# Patient Record
Sex: MALE | Race: WHITE
[De-identification: names, ages, dates, MRNs, and addresses within clinical notes are randomized per-mention and may not be internally consistent; named-entity substitution may affect disease eponyms.]

---

## 2020-02-27 ENCOUNTER — HOSPITAL ENCOUNTER (EMERGENCY)
Dept: HOSPITAL 97 - ER | Age: 40
Discharge: HOME | End: 2020-02-27
Payer: SELF-PAY

## 2020-02-27 DIAGNOSIS — J01.90: Primary | ICD-10-CM

## 2020-02-27 DIAGNOSIS — F17.210: ICD-10-CM

## 2020-02-27 PROCEDURE — 99283 EMERGENCY DEPT VISIT LOW MDM: CPT

## 2020-02-27 PROCEDURE — 87070 CULTURE OTHR SPECIMN AEROBIC: CPT

## 2020-02-27 PROCEDURE — 71046 X-RAY EXAM CHEST 2 VIEWS: CPT

## 2020-02-27 PROCEDURE — 87081 CULTURE SCREEN ONLY: CPT

## 2020-02-27 NOTE — ER
Nurse's Notes                                                                                     

 [facility]                                                                 

Name: Gil Carrasco                                                                                  

Age: 39 yrs                                                                                       

Sex: Male                                                                                         

: 1980                                                                                   

MRN: [id number]                                                                                   

Arrival Date: 2020                                                                          

Time: 21:30                                                                                       

Account#: [id number]                                                                            

Bed 27                                                                                            

Private MD:                                                                                       

Diagnosis: Acute sinusitis                                                                        

                                                                                                  

Presentation:                                                                                     

                                                                                             

22:36 Chief complaint: Patient states: BEEN SICK FOR FOUR WEEKS AND JUST CANT KICK IT.        ls4 

      STARTED WITH FLU, THEN COUGH AND CONGESTION. STILL COUGHING, RUNNY NOSE. Coronavirus        

      screen: The patient has NOT traveled to [place] in the past 14 days. Proceed with normal      

      triage procedures. The patient has NOT had contact with known and/or suspected case of      

      Coronavirus. Proceed with normal triage procedures. Ebola Screen: No symptoms or risks      

      identified at this time. Initial Sepsis Screen: Does the patient meet any 2 criteria?       

      No. Patient's initial sepsis screen is negative. Does the patient have a suspected          

      source of infection? No. Patient's initial sepsis screen is negative. Risk Assessment:      

      Do you want to hurt yourself or someone else? Patient reports no desire to harm self or     

      others. Onset of symptoms is unknown. Care prior to arrival: BEEN TAKING OTC MEDS.          

      Activity prior to arrival: None. Transition of care: patient was not received from          

      another setting of care.                                                                    

22:36 Method Of Arrival: Ambulatory                                                           ls4 

22:36 Acuity: ARTUR 3                                                                           ls4 

                                                                                                  

Triage Assessment:                                                                                

22:41 General: Appears uncomfortable, Behavior is calm, cooperative. Pain: Denies pain.       ls4 

      Neuro: No deficits noted. Cardiovascular: No deficits noted. Respiratory: Reports cough     

      that is productive, persistent Airway is patent Respiratory effort is even, unlabored,      

      Respiratory pattern is regular, Breath sounds are clear bilaterally. the patient has        

      moderate shortness of breath Denies shortness of breath at rest, on exertion, labored       

      breathing, pain with respiration, pain with cough, pain with movement. GI: No deficits      

      noted. : No deficits noted. Derm: No deficits noted. Musculoskeletal: No deficits         

      noted.                                                                                      

                                                                                                  

Historical:                                                                                       

- Allergies:                                                                                      

22:41 No Known Allergies;                                                                     ls4 

- Home Meds:                                                                                      

22:41 None [Active];                                                                          ls4 

- PMHx:                                                                                           

22:41 None;                                                                                   ls4 

- PSHx:                                                                                           

22:41 None;                                                                                   ls4 

                                                                                                  

- Immunization history:: Adult Immunizations up to date, Last tetanus immunization:               

  unknown, Flu vaccine is not up to date. It has been more than one year since last               

  vaccine.                                                                                        

- Social history:: Smoking status: Patient reports the use of cigarette tobacco                   

  products, smokes one-half pack cigarettes per day, Patient/guardian denies using                

  alcohol, street drugs.                                                                          

                                                                                                  

                                                                                                  

Screenin:45 Abuse screen: Denies threats or abuse. Denies injuries from another. Nutritional        ls4 

      screening: No deficits noted. Tuberculosis screening: No symptoms or risk factors           

      identified. Fall Risk None identified.                                                      

                                                                                                  

Assessment:                                                                                       

22:44 General: SEE TRIAGE .                                                                   ls4 

23:45 Reassessment: Patient appears in no apparent distress at this time. No changes from     ls4 

      previously documented assessment. Patient and/or family updated on plan of care and         

      expected duration. Pain level reassessed. Patient is alert, oriented x 3, equal             

      unlabored respirations, skin warm/dry/pink.                                                 

                                                                                                  

Vital Signs:                                                                                      

22:13  / 93; Pulse 97; Resp 17; Temp 97.9; Pulse Ox 98% ;                               lt1 

23:45  / 84; Pulse 78; Resp 14; Temp 98.0(O); Pulse Ox 98% on R/A; Pain 0/10;           ls4 

                                                                                                  

ED Course:                                                                                        

21:30 Patient arrived in ED.                                                                  ag3 

22:12 Mayte Hull, RN is Primary Nurse.                                                     ls4 

22:34 [facility]                                                        kb  

22:34 Charles Priteo MD is Attending Physician.                                                    kb  

22:41 Triage completed.                                                                       ls4 

22:41 Arm band placed on right wrist.                                                         ls4 

22:45 Patient has correct armband on for positive identification. Bed in low position. Call   ls4 

      light in reach. Side rails up X 1. Pulse ox on. NIBP on.                                    

22:45 No provider procedures requiring assistance completed.                                  ls4 

22:59 Chest Pa And Lat (2 Views) XRAY In Process Unspecified.                                 EDMS

23:46 Patient did not have IV access during this emergency room visit.                        ls4 

                                                                                                  

Administered Medications:                                                                         

No medications were administered                                                                  

                                                                                                  

                                                                                                  

Outcome:                                                                                          

23:26 Discharge ordered by MD.                                                                kb  

23:45 Discharged to home ambulatory.                                                          ls4 

23:45 Condition: good                                                                             

23:45 Discharge instructions given to patient, family, Instructed on discharge instructions,      

      follow up and referral plans. Demonstrated understanding of instructions, follow-up         

      care, medications, Prescriptions given X 1.                                                 

23:46 Patient left the ED.                                                                    ls4 

                                                                                                  

Signatures:                                                                                       

Dispatcher [employer]                           EDAbby Guido, FNP-C                 FNP-Ckb                                                   

Karolina Melendrez                                 ag3                                                  

Mayte Hull, RN                       RN   ls4                                                  

Francisca Echevarria                                   lt1                                                  

                                                                                                  

Corrections: (The following items were deleted from the chart)                                    

23:46 23:42 Discharged to nursing home. Report called to DEN CALABRESE ls4                     ls4 

                                                                                                  

**************************************************************************************************

## 2020-02-27 NOTE — EDPHYS
Physician Documentation                                                                           

 [facility]                                                                 

Name: Gil Carrasco                                                                                  

Age: 39 yrs                                                                                       

Sex: Male                                                                                         

: 1980                                                                                   

MRN: [id number]                                                                                   

Arrival Date: 2020                                                                          

Time: 21:30                                                                                       

Account#: [id number]                                                                            

Bed 27                                                                                            

Private MD:                                                                                       

ED Physician Charles Prieto                                                                             

HPI:                                                                                              

                                                                                             

23:33 This 39 yrs old  Male presents to ER via Ambulatory with complaints of Cough.  kb  

23:33 The patient or guardian reports cough, that is intermittent, described as moderate,     kb  

      with no sputum, flu symptoms, low-grade fever. Onset: The symptoms/episode                  

      began/occurred 4 week(s) ago. Severity of symptoms: At their worst the symptoms were        

      moderate, in the emergency department the symptoms are unchanged. Modifying factors:        

      The symptoms are alleviated by nothing, the symptoms are aggravated by nothing.             

      Associated signs and symptoms: Pertinent positives: fever, rhinorrhea, sore throat. The     

      patient has not experienced similar symptoms in the past. The patient has not recently      

      seen a physician. Pt reports he has been sick for 4 weeks. Reports it started out as        

      the flu and then progressed. Reports fever subsided last week, but continues to have        

      cough and congestion.                                                                       

                                                                                                  

Historical:                                                                                       

- Allergies:                                                                                      

22:41 No Known Allergies;                                                                     ls4 

- Home Meds:                                                                                      

22:41 None [Active];                                                                          ls4 

- PMHx:                                                                                           

22:41 None;                                                                                   ls4 

- PSHx:                                                                                           

22:41 None;                                                                                   ls4 

                                                                                                  

- Immunization history:: Adult Immunizations up to date, Last tetanus immunization:               

  unknown, Flu vaccine is not up to date. It has been more than one year since last               

  vaccine.                                                                                        

- Social history:: Smoking status: Patient reports the use of cigarette tobacco                   

  products, smokes one-half pack cigarettes per day, Patient/guardian denies using                

  alcohol, street drugs.                                                                          

                                                                                                  

                                                                                                  

ROS:                                                                                              

23:30 Neck: Negative for injury, pain, and swelling, Cardiovascular: Negative for chest pain, kb  

      palpitations, and edema, Abdomen/GI: Negative for abdominal pain, nausea, vomiting,         

      diarrhea, and constipation, Back: Negative for injury and pain, MS/Extremity: Negative      

      for injury and deformity, Skin: Negative for injury, rash, and discoloration, Neuro:        

      Negative for headache, weakness, numbness, tingling, and seizure.                           

23:30 Constitutional: Positive for fever.                                                         

23:30 ENT: Positive for rhinorrhea, sinus congestion.                                             

23:30 Respiratory: Positive for cough, Negative for dyspnea on exertion, hemoptysis,              

      orthopnea, pleurisy, shortness of breath, sputum production, wheezing.                      

                                                                                                  

Exam:                                                                                             

23:32 Constitutional:  This is a well developed, well nourished patient who is awake, alert,  kb  

      and in no acute distress. Head/Face:  Normocephalic, atraumatic. ENT:  Nares patent. No     

      nasal discharge, no septal abnormalities noted.  Tympanic membranes are normal and          

      external auditory canals are clear.  Oropharynx with no redness, swelling, or masses,       

      exudates, or evidence of obstruction, uvula midline.  Mucous membranes moist. Neck:         

      Trachea midline, no thyromegaly or masses palpated, and no cervical lymphadenopathy.        

      Supple, full range of motion without nuchal rigidity, or vertebral point tenderness.        

      No Meningismus. Chest/axilla:  Normal chest wall appearance and motion.  Nontender with     

      no deformity.  No lesions are appreciated. Cardiovascular:  Regular rate and rhythm         

      with a normal S1 and S2.  No gallops, murmurs, or rubs.  Normal PMI, no JVD.  No pulse      

      deficits. Respiratory:  Lungs have equal breath sounds bilaterally, clear to                

      auscultation and percussion.  No rales, rhonchi or wheezes noted.  No increased work of     

      breathing, no retractions or nasal flaring. Abdomen/GI:  Soft, non-tender, with normal      

      bowel sounds.  No distension or tympany.  No guarding or rebound.  No evidence of           

      tenderness throughout. Skin:  Warm, dry with normal turgor.  Normal color with no           

      rashes, no lesions, and no evidence of cellulitis. MS/ Extremity:  Pulses equal, no         

      cyanosis.  Neurovascular intact.  Full, normal range of motion. Neuro:  Awake and           

      alert, GCS 15, oriented to person, place, time, and situation.  Cranial nerves II-XII       

      grossly intact.  Motor strength 5/5 in all extremities.  Sensory grossly intact.            

      Cerebellar exam normal.  Normal gait.                                                       

                                                                                                  

Vital Signs:                                                                                      

22:13  / 93; Pulse 97; Resp 17; Temp 97.9; Pulse Ox 98% ;                               lt1 

23:45  / 84; Pulse 78; Resp 14; Temp 98.0(O); Pulse Ox 98% on R/A; Pain 0/10;           ls4 

                                                                                                  

MDM:                                                                                              

22:34 Patient medically screened.                                                             kb  

23:30 Data reviewed: vital signs, nurses notes. Data interpreted: Pulse oximetry: on room air kb  

      is 98 %. Interpretation: normal. Counseling: I had a detailed discussion with the           

      patient and/or guardian regarding: the historical points, exam findings, and any            

      diagnostic results supporting the discharge/admit diagnosis, lab results, radiology         

      results, the need for outpatient follow up, a family practitioner, to return to the         

      emergency department if symptoms worsen or persist or if there are any questions or         

      concerns that arise at home.                                                                

                                                                                                  

                                                                                             

22:48 Order name: Strep; Complete Time: 23:28                                                 kb  

                                                                                             

23:26 Order name: Throat Culture                                                              EDMS

                                                                                             

22:43 Order name: Chest Pa And Lat (2 Views) XRAY                                             kb  

                                                                                                  

Administered Medications:                                                                         

No medications were administered                                                                  

                                                                                                  

                                                                                                  

Disposition:                                                                                      

                                                                                             

01:53 Co-signature as Attending Physician, Charlse Prieto MD.                                        alona 

                                                                                                  

Disposition:                                                                                      

20 23:26 Discharged to Home. Impression: Acute sinusitis.                                   

- Condition is Stable.                                                                            

- Discharge Instructions: Sinusitis, Adult, Easy-to-Read.                                         

- Prescriptions for Augmentin 875- 125 mg Oral Tablet - take 1 tablet by ORAL route               

  every 12 hours for 10 days; 20 tablet.                                                          

- Medication Reconciliation Form, Thank You Letter, Antibiotic Education, Prescription            

  Opioid Use form.                                                                                

- Follow up: Private Physician; When: 2 - 3 days; Reason: Recheck today's complaints,             

  Continuance of care, Re-evaluation by your physician. Follow up: Emergency                      

  Department; When: As needed; Reason: Worsening of condition.                                    

                                                                                                  

                                                                                                  

                                                                                                  

Signatures:                                                                                       

Dispatcher [employer]                           [facility]                                                 

Abby Veliz, FNP-C                 FNP-Ckb                                                   

Charles Prieto MD MD   pkl                                                  

Mayte Hull, RN                       RN   ls4                                                  

                                                                                                  

Corrections: (The following items were deleted from the chart)                                    

                                                                                             

23:46 23:26 2020 23:26 Discharged to Home. Impression: Acute sinusitis. Condition is    ls4 

      Stable. Forms are Medication Reconciliation Form, Thank You Letter, Antibiotic              

      Education, Prescription Opioid Use. Follow up: Private Physician; When: 2 - 3 days;         

      Reason: Recheck today's complaints, Continuance of care, Re-evaluation by your              

      physician. Follow up: Emergency Department; When: As needed; Reason: Worsening of           

      condition. kb                                                                               

                                                                                                  

**************************************************************************************************

## 2020-02-28 VITALS — OXYGEN SATURATION: 98 %

## 2020-02-28 VITALS — TEMPERATURE: 98 F | DIASTOLIC BLOOD PRESSURE: 84 MMHG | SYSTOLIC BLOOD PRESSURE: 112 MMHG

## 2020-02-28 NOTE — RAD REPORT
EXAM DESCRIPTION:  RAD - Chest Pa And Lat (2 Views) - 2/27/2020 11:00 pm

 

CLINICAL HISTORY:  Cough;Congestion

 

COMPARISON:  CHEST PA AND LAT 2 VIEW dated 6/18/2009

 

TECHNIQUE:  Frontal and lateral views of the chest were obtained.

 

FINDINGS:  The lungs are clear of a focal mass or consolidation. Interstitial pattern is similar or s
lightly increased from 2009 remote imaging. No peribronchial thickening seen. Hilar regions are simil
ar to comparison.   Heart size is normal and central vasculature is within normal limits.  No pleural
 effusion or pneumothorax seen.  No acute bony finding noted.  No aortic abnormality.

 

IMPRESSION:  No acute cardiopulmonary process.

 

Lung parenchymal pattern is not substantially different from comparison. Very minimal component of in
terstitial edema or infiltrate cannot be excluded.

## 2020-03-19 ENCOUNTER — HOSPITAL ENCOUNTER (EMERGENCY)
Dept: HOSPITAL 97 - ER | Age: 40
Discharge: HOME | End: 2020-03-19
Payer: SELF-PAY

## 2020-03-19 DIAGNOSIS — F17.210: ICD-10-CM

## 2020-03-19 DIAGNOSIS — J02.9: ICD-10-CM

## 2020-03-19 DIAGNOSIS — J20.9: Primary | ICD-10-CM

## 2020-03-19 PROCEDURE — 87081 CULTURE SCREEN ONLY: CPT

## 2020-03-19 PROCEDURE — 87804 INFLUENZA ASSAY W/OPTIC: CPT

## 2020-03-19 PROCEDURE — 87070 CULTURE OTHR SPECIMN AEROBIC: CPT

## 2020-03-19 PROCEDURE — 96372 THER/PROPH/DIAG INJ SC/IM: CPT

## 2020-03-19 PROCEDURE — 71045 X-RAY EXAM CHEST 1 VIEW: CPT

## 2020-03-19 PROCEDURE — 99284 EMERGENCY DEPT VISIT MOD MDM: CPT

## 2020-03-19 NOTE — ER
Nurse's Notes                                                                                     

 [facility]                                                                 

Name: Gil Carrasco                                                                                  

Age: 39 yrs                                                                                       

Sex: Male                                                                                         

: 1980                                                                                   

MRN: [id number]                                                                                   

Arrival Date: 2020                                                                          

Time: 19:30                                                                                       

Account#: [id number]                                                                            

Bed 15                                                                                            

Private MD:                                                                                       

Diagnosis: Acute bronchitis;Acute pharyngitis                                                     

                                                                                                  

Presentation:                                                                                     

                                                                                             

19:48 Chief complaint: Patient states: raz have cough and fever for almost 2 months now, i    mg2 

      was here 1 month ago and did the cxray and sent home with abx, got better for a week        

      and i am sick again. i have productive cough and my chest feels tight everytime i           

      cough. Coronavirus screen: The patient has NOT traveled to a country currently being        

      monitored by the [facility] within the last 14 days. Proceed with normal triage procedures.        

      The patient has NOT had contact with any known and/or suspected case of coronavirus.        

      Proceed with normal triage procedures. Ebola Screen: No symptoms or risks identified at     

      this time. Initial Sepsis Screen: Does the patient meet any 2 criteria? No. Patient's       

      initial sepsis screen is negative. Does the patient have a suspected source of              

      infection? No. Patient's initial sepsis screen is negative. Risk Assessment: Do you         

      want to hurt yourself or someone else? Patient reports no desire to harm self or others.    

19:48 Method Of Arrival: Ambulatory                                                           mg2 

19:48 Acuity: ARTUR 3                                                                           mg2 

                                                                                                  

Triage Assessment:                                                                                

21:40 General: Appears in no apparent distress. comfortable, Behavior is calm, cooperative.   mg2 

      Respiratory: Onset: The symptoms/episode began/occurred gradually, the patient has mild     

      shortness of breath.                                                                        

                                                                                                  

Historical:                                                                                       

- Allergies:                                                                                      

21:40 No Known Allergies;                                                                     mg2 

- Home Meds:                                                                                      

21:40 None [Active];                                                                          mg2 

- PMHx:                                                                                           

21:40 None;                                                                                   mg2 

- PSHx:                                                                                           

21:40 None;                                                                                   mg2 

                                                                                                  

- Immunization history:: Flu vaccine is not up to date.                                           

- Social history:: Smoking status: Patient reports the use of cigarette tobacco                   

  products, smokes one-half pack cigarettes per day.                                              

                                                                                                  

                                                                                                  

Screenin:39 Abuse screen: Denies threats or abuse. Denies injuries from another. Nutritional        mg2 

      screening: No deficits noted. Tuberculosis screening: No symptoms or risk factors           

      identified. Fall Risk None identified.                                                      

                                                                                                  

Assessment:                                                                                       

20:00 General: Appears in no apparent distress. comfortable. Pain: Denies pain. Neuro: Level  mg2 

      of Consciousness is awake, alert, obeys commands, Oriented to person, place, time,          

      situation. Cardiovascular: Rhythm is sinus rhythm. Respiratory: Airway is patent            

      Respiratory effort is even, unlabored, Respiratory pattern is regular, symmetrical,         

      Breath sounds are clear bilaterally. in mediastinum, right upper lobe, left upper lobe,     

      right middle lobe, left lower lobe and right lower lobe. Respiratory: Reports cough         

      that is productive. GI: No signs and/or symptoms were reported involving the                

      gastrointestinal system. : No signs and/or symptoms were reported regarding the           

      genitourinary system. EENT: No signs and/or symptoms were reported regarding the EENT       

      system. Derm: Skin is intact, is healthy with good turgor, Skin is pink, warm \T\ dry.      

      normal. Musculoskeletal: Circulation, motion, and sensation intact. Capillary refill <      

      3 seconds.                                                                                  

                                                                                                  

Vital Signs:                                                                                      

19:48  / 91; Pulse 88; Resp 18; Temp 99.1; Pulse Ox 100% on R/A; Weight 99.79 kg;       mg2 

      Height 5 ft. 9 in. (175.26 cm);                                                             

22:16  / 87; Pulse 89; Resp 18; Temp 98.5; Pulse Ox 100% on R/A;                        mg2 

19:48 Body Mass Index 32.49 (99.79 kg, 175.26 cm)                                             mg2 

                                                                                                  

ED Course:                                                                                        

19:30 Patient arrived in ED.                                                                  cl3 

19:40 Jose Bernstein, RN is Primary Nurse.                                                  mg2 

19:44 Demetris Berry PA is PHCP.                                                              jm 

19:44 David Dee MD is Attending Physician.                                             [facility] 

19:52 Triage completed.                                                                       mg2 

19:52 Arm band placed on.                                                                     mg2 

20:02 Chest Single View XRAY In Process Unspecified.                                          EDMS

21:39 Patient has correct armband on for positive identification.                             mg2 

21:39 No provider procedures requiring assistance completed. Patient did not have IV access   mg2 

      during this emergency room visit.                                                           

                                                                                                  

Administered Medications:                                                                         

20:35 Drug: Xopenex (3) 1.25 mg Route: Inhalation;                                            mg2 

21:42 Follow up: Response: No adverse reaction                                                mg2 

21:54 Drug: Decadron 10 mg Route: IM; Site: right gluteus;                                    mg2 

22:17 Follow up: Response: No adverse reaction                                                mg2 

                                                                                                  

                                                                                                  

Outcome:                                                                                          

22:07 Discharge ordered by MD.                                                                jmm 

22:16 Discharged to home ambulatory.                                                          mg2 

22:16 Condition: stable                                                                           

22:16 Discharge instructions given to patient, Instructed on discharge instructions, follow       

      up and referral plans. medication usage, Demonstrated understanding of instructions,        

      follow-up care, medications, Prescriptions given X 2.                                       

22:17 Patient left the ED.                                                                    mg2 

                                                                                                  

Signatures:                                                                                       

Dispatcher [employer]                           EDDemetris Jasmine PA PA jmm Gardose, Michele, RN                    RN   mg2                                                  

Stefania Hart                                cl3                                                  

                                                                                                  

**************************************************************************************************

## 2020-03-19 NOTE — RAD REPORT
EXAM DESCRIPTION:  RAD - Chest Single View - 3/19/2020 8:02 pm

 

CLINICAL HISTORY:  fever, cough

Chest pain.

 

COMPARISON:  Chest Pa And Lat (2 Views) dated 2/27/2020; CHEST PA AND LAT 2 VIEW dated 6/18/2009

 

FINDINGS:  Portable technique limits examination quality.

 

The lungs are grossly clear. The heart is normal in size. No displaced fractures.

 

IMPRESSION:  No acute intrathoracic process suspected.

## 2020-03-19 NOTE — EDPHYS
Physician Documentation                                                                           

 [facility]                                                                 

Name: Gil Carrasco                                                                                  

Age: 39 yrs                                                                                       

Sex: Male                                                                                         

: 1980                                                                                   

MRN: [id number]                                                                                   

Arrival Date: 2020                                                                          

Time: 19:30                                                                                       

Account#: [id number]                                                                            

Bed 15                                                                                            

Private MD:                                                                                       

ED Physician David Dee                                                                      

HPI:                                                                                              

                                                                                             

20:06 This 39 yrs old  Male presents to ER via Ambulatory with complaints of         jmm 

      Productive Cough.                                                                           

20:06 The patient or guardian reports cough. Onset: The symptoms/episode began/occurred       jmm 

      gradually, 1 month(s) ago. Modifying factors: The symptoms are alleviated by nothing,       

      the symptoms are aggravated by nothing. Associated signs and symptoms: Pertinent            

      positives: fever, sore throat. This is a 39 year old male with no chronic medical           

      conditions that presents to the ED with complaints of sore throat, cough, beginning         

      approx 1 month ago. Symptoms resolved after abx but have returned over the past week.       

      Denies recent travel, denies known infectious exposure to covid. .                          

                                                                                                  

Historical:                                                                                       

- Allergies:                                                                                      

21:40 No Known Allergies;                                                                     mg2 

- Home Meds:                                                                                      

21:40 None [Active];                                                                          mg2 

- PMHx:                                                                                           

21:40 None;                                                                                   mg2 

- PSHx:                                                                                           

21:40 None;                                                                                   mg2 

                                                                                                  

- Immunization history:: Flu vaccine is not up to date.                                           

- Social history:: Smoking status: Patient reports the use of cigarette tobacco                   

  products, smokes one-half pack cigarettes per day.                                              

                                                                                                  

                                                                                                  

ROS:                                                                                              

20:06 Constitutional: Positive for fever.                                                     jmm 

20:06 ENT: Positive for sore throat.                                                              

20:06 Respiratory: Positive for cough.                                                            

20:06 All other systems are negative.                                                             

                                                                                                  

Exam:                                                                                             

20:06 Constitutional:  This is a well developed, well nourished patient who is awake, alert,  jmm 

      and in no acute distress. Head/Face:  atraumatic. Eyes:  EOMI, no conjunctival erythema     

      appreciated                                                                                 

20:06 Chest/axilla:  Normal chest wall appearance and motion.                                     

20:06 Abdomen/GI:  Non distended, soft Back:  Normal ROM Skin:  General appearance color          

      normal MS/ Extremity:  Moves all extremities, no obvious deformities appreciated, no        

      edema noted to the lower extremities  Neuro:  Awake and alert, normal gait Psych:           

      Behavior is normal, Mood is normal, Patient is cooperative and pleasant                     

20:06 ENT: Posterior pharynx: erythema, that is mild.                                             

20:06 Cardiovascular: Rate: normal, Rhythm: regular, Pulses: no pulse deficits are                

      appreciated.                                                                                

20:06 Respiratory: the patient does not display signs of respiratory distress,  Respirations:     

      normal, Breath sounds: are clear throughout.                                                

                                                                                                  

Vital Signs:                                                                                      

19:48  / 91; Pulse 88; Resp 18; Temp 99.1; Pulse Ox 100% on R/A; Weight 99.79 kg;       mg2 

      Height 5 ft. 9 in. (175.26 cm);                                                             

22:16  / 87; Pulse 89; Resp 18; Temp 98.5; Pulse Ox 100% on R/A;                        mg2 

19:48 Body Mass Index 32.49 (99.79 kg, 175.26 cm)                                             mg2 

                                                                                                  

MDM:                                                                                              

19:52 Patient medically screened.                                                             [facility] 

22:06 Data reviewed: vital signs, nurses notes. Counseling: I had a detailed discussion with  [facility] 

      the patient and/or guardian regarding: the historical points, exam findings, and any        

      diagnostic results supporting the discharge/admit diagnosis, lab results, radiology         

      results, the need for outpatient follow up, to return to the emergency department if        

      symptoms worsen or persist or if there are any questions or concerns that arise at          

      home. ED course: Patient states he feels much better. Lungs CTA on reevaluation.            

      Patient is alert and non toxic in appearance in the ED. Patient is otherwise given          

      strict return precautions. Patient understood and agrees with the plan of care. .           

                                                                                                  

                                                                                             

19:53 Order name: Flu; Complete Time: 20:59                                                   [facility] 

                                                                                             

19:53 Order name: Strep; Complete Time: 20:59                                                 [facility] 

                                                                                             

19:53 Order name: Chest Single View XRAY; Complete Time: 20:17                                [facility] 

                                                                                             

20:55 Order name: Throat Culture                                                              EDMS

                                                                                                  

Administered Medications:                                                                         

20:35 Drug: Xopenex (3) 1.25 mg Route: Inhalation;                                            mg2 

21:42 Follow up: Response: No adverse reaction                                                mg2 

21:54 Drug: Decadron 10 mg Route: IM; Site: right gluteus;                                    mg2 

22:17 Follow up: Response: No adverse reaction                                                mg2 

                                                                                                  

                                                                                                  

Disposition:                                                                                      

                                                                                             

12:05 Co-signature as Attending Physician, David Dee MD I agree with the assessment and  zenaida 

      plan of care.                                                                               

                                                                                                  

Disposition:                                                                                      

20 22:07 Discharged to Home. Impression: Acute bronchitis, Acute pharyngitis.               

- Condition is Stable.                                                                            

- Discharge Instructions: Acute Bronchitis, Adult, Pharyngitis.                                   

- Prescriptions for Zithromax Z- Alonso 250 mg Oral Tablet - take 1 tablet by ORAL route             

  as directed for 5 days Day 1 - take two (2) tablets one time. Day 2, 3, 4 , 5 take              

  one (1) tablet once daily.; 6 tablet. Albuterol Sulfate 90 mcg/actuation - inhale 1-2           

  puff by INHALATION route every 4-6 hours; 1 Inhaler.                                            

- Medication Reconciliation Form, Thank You Letter, Antibiotic Education, Prescription            

  Opioid Use, Work release form form.                                                             

- Follow up: Private Physician; When: 2 - 3 days; Reason: Recheck today's complaints,             

  Continuance of care, Re-evaluation by your physician.                                           

- Notes: Please self quarantine for 14 days Return to the emergency department if you             

  develop worsening symptoms, increased shortness of breath, chest pain or any other              

  concerning symptoms.                                                                            

                                                                                                  

                                                                                                  

Signatures:                                                                                       

Dispatcher [employer]                           EDMS                                                 

David Dee MD MD cha Mickail, Joel, PA PA jmm Gardose, Michele, RN                    RN   mg2                                                  

                                                                                                  

Corrections: (The following items were deleted from the chart)                                    

                                                                                             

22:17 22:07 2020 22:07 Discharged to Home. Impression: Acute bronchitis; Acute          mg2 

      pharyngitis. Condition is Stable. Forms are Work release form, Medication                   

      Reconciliation Form, Thank You Letter, Antibiotic Education, Prescription Opioid Use.       

      Follow up: Private Physician; When: 2 - 3 days; Reason: Recheck today's complaints,         

      Continuance of care, Re-evaluation by your physician. raman                                   

                                                                                                  

**************************************************************************************************

## 2023-08-13 ENCOUNTER — HOSPITAL ENCOUNTER (EMERGENCY)
Dept: HOSPITAL 97 - ER | Age: 43
Discharge: HOME | End: 2023-08-13
Payer: COMMERCIAL

## 2023-08-13 VITALS — SYSTOLIC BLOOD PRESSURE: 146 MMHG | TEMPERATURE: 98.7 F | DIASTOLIC BLOOD PRESSURE: 90 MMHG

## 2023-08-13 VITALS — OXYGEN SATURATION: 98 %

## 2023-08-13 DIAGNOSIS — J06.9: Primary | ICD-10-CM

## 2023-08-13 DIAGNOSIS — I10: ICD-10-CM

## 2023-08-13 DIAGNOSIS — F17.210: ICD-10-CM

## 2023-08-13 DIAGNOSIS — Z20.822: ICD-10-CM

## 2023-08-13 PROCEDURE — 99283 EMERGENCY DEPT VISIT LOW MDM: CPT

## 2023-08-13 PROCEDURE — 87804 INFLUENZA ASSAY W/OPTIC: CPT

## 2023-08-13 PROCEDURE — 87635 SARS-COV-2 COVID-19 AMP PRB: CPT

## 2023-08-13 NOTE — ER
Nurse's Notes                                                                                     

 [facility]                                                                 

Name: Gil Carrasco                                                                                  

Age: 42 yrs                                                                                       

Sex: Male                                                                                         

: 1980                                                                                   

MRN: [id number]                                                                                   

Arrival Date: 2023                                                                          

Time: 13:45                                                                                       

Account#: [id number]                                                                            

Bed 19                                                                                            

Private MD:                                                                                       

Diagnosis: Acute upper respiratory infection, unspecified                                         

                                                                                                  

Presentation:                                                                                     

                                                                                             

14:10 Chief complaint: Patient states: Sick since Tuesday, congested, cough, nausea,          nj1 

      vomiting, diarrhea, headache. Was around nephew that was sicks. Coronavirus screen:         

      Vaccine status: Patient reports being unvaccinated. Ebola Screen: Patient denies travel     

      to an Ebola-affected area in the 21 days before illness onset. Initial Sepsis Screen:       

      Does the patient meet any 2 criteria? No. Patient's initial sepsis screen is negative.      

      Does the patient have a suspected source of infection? No. Patient's initial sepsis         

      screen is negative. Risk Assessment: Do you want to hurt yourself or someone else?          

      Patient reports no desire to harm self or others. Onset of symptoms was 2023.    

14:10 Method Of Arrival: Ambulatory                                                           [facility] 

14:10 Acuity: ARTUR 4                                                                           nj1 

                                                                                                  

Historical:                                                                                       

- Allergies:                                                                                      

14:14 No Known Allergies;                                                                     nj1 

- PMHx:                                                                                           

14:14 Hypertensive disorder;                                                                  nj1 

- PSHx:                                                                                           

14:14 None;                                                                                   nj1 

                                                                                                  

- Immunization history:: Client reports having NOT received the Covid vaccine.                    

- Social history:: Smoking status: Patient reports the use of cigarette tobacco                   

  products, smokes one pack cigarettes per day.                                                   

                                                                                                  

                                                                                                  

Screenin:45 [facility] ED Fall Risk Assessment (Adult) History of falling in the last 3 months,       jl7 

      including since admission No falls in past 3 months (0 pts) Confusion or Disorientation     

      No (0 pts) Intoxicated or Sedated No (0 pts) Impaired Gait No (0 pts) Mobility Assist       

      Device Used No (0 pt) Altered Elimination No (0 pt) Score/Fall Risk Level 0 - 2 = Low       

      Risk Oriented to surroundings, Maintained a safe environment. Abuse screen: Denies          

      threats or abuse. Denies injuries from another. Nutritional screening: No deficits          

      noted. Tuberculosis screening: No symptoms or risk factors identified.                      

                                                                                                  

Assessment:                                                                                       

14:50 General: Appears in no apparent distress. uncomfortable, Behavior is calm, cooperative, jl7 

      appropriate for age. Pain: Denies pain. Neuro: Level of Consciousness is awake, alert,      

      obeys commands, Oriented to person, place, time, situation. Cardiovascular: Patient's       

      skin is warm and dry. Respiratory: Airway is patent Respiratory effort is even,             

      unlabored, Respiratory pattern is regular, symmetrical, not auscultated. Derm: Skin is      

      pink, warm \T\ dry.                                                                         

                                                                                                  

Vital Signs:                                                                                      

14:10  / 105; Pulse 90; Resp 18; Temp 99.1(TE); Pulse Ox 98% ; Weight 90.72 kg; Height  nj1 

      5 ft. 8 in. ;                                                                               

15:46  / 90; Pulse 89; Resp 15; Temp 98.7(O); Pulse Ox 98% ;                            jl7 

14:10 Body Mass Index 30.41 (90.72 kg, 172.72 cm)                                             nj1 

                                                                                                  

ED Course:                                                                                        

13:48 Patient arrived in ED.                                                                  rg4 

13:54 Abby Veliz FNP-C is [facility].                                                        kb  

13:54 Adolfo Guerra MD is Attending Physician.                                              kb  

14:14 Triage completed.                                                                       nj1 

14:14 Arm band placed on right wrist.                                                         nj1 

14:36 Ernestina Rosen, RN is Primary Nurse.                                                      jl7 

14:45 Patient has correct armband on for positive identification. Provided Education on: use  jl7 

      of call bell.                                                                               

15:40 No provider procedures requiring assistance completed. Patient did not have IV access   jl7 

      during this emergency room visit.                                                           

                                                                                                  

Administered Medications:                                                                         

14:40 Drug: Ibuprofen  mg Route: PO;                                                    nj1 

15:46 Follow up: Response: Temperature is decreased                                           jl7 

                                                                                                  

                                                                                                  

Medication:                                                                                       

15:46 VIS not applicable for this client.                                                     jl7 

                                                                                                  

Outcome:                                                                                          

15:10 Discharge ordered by MD.                                                                kb  

15:40 Discharged to home ambulatory.                                                          jl7 

15:40 Condition: stable                                                                           

15:40 Discharge instructions given to patient, Instructed on discharge instructions, follow       

      up and referral plans. medication usage, Demonstrated understanding of instructions,        

      follow-up care, medications, Prescriptions given X 1.                                       

15:50 Patient left the ED.                                                                    jl7 

                                                                                                  

Signatures:                                                                                       

Abby Veliz FNP-C FNP-Lamar Meng                                 rg4                                                  

Ernestina Rosen, RN                        RN   jl7                                                  

Beatrice Toney RN                         RN   nj1                                                  

                                                                                                  

**************************************************************************************************

## 2023-08-13 NOTE — EDPHYS
Physician Documentation                                                                           

 [facility]                                                                 

Name: Gil Carrasco                                                                                  

Age: 42 yrs                                                                                       

Sex: Male                                                                                         

: 1980                                                                                   

MRN: [id number]                                                                                   

Arrival Date: 2023                                                                          

Time: 13:45                                                                                       

Account#: [id number]                                                                            

Bed 19                                                                                            

Private MD:                                                                                       

ED Physician Adolfo Guerra                                                                       

HPI:                                                                                              

                                                                                             

15:09 This 42 yrs old Male presents to ER via Ambulatory with complaints of Cough, Congestion.kb  

15:09 The patient or guardian reports cough, flu symptoms. Onset: The symptoms/episode        kb  

      began/occurred 6 day(s) ago. Severity of symptoms: At their worst the symptoms were         

      moderate, in the emergency department the symptoms are unchanged. Modifying factors:        

      The symptoms are alleviated by nothing, the symptoms are aggravated by nothing.             

      Associated signs and symptoms: Pertinent positives: fever, rhinorrhea, Pertinent            

      negatives: sore throat, vomiting. The patient has not experienced similar symptoms in       

      the past. The patient has not recently seen a physician. Pt reports cough, congestion,      

      fever and post-tussive vomiting that started on Tuesday.                                    

                                                                                                  

Historical:                                                                                       

- Allergies:                                                                                      

14:14 No Known Allergies;                                                                     nj1 

- PMHx:                                                                                           

14:14 Hypertensive disorder;                                                                  nj1 

- PSHx:                                                                                           

14:14 None;                                                                                   nj1 

                                                                                                  

- Immunization history:: Client reports having NOT received the Covid vaccine.                    

- Social history:: Smoking status: Patient reports the use of cigarette tobacco                   

  products, smokes one pack cigarettes per day.                                                   

                                                                                                  

                                                                                                  

ROS:                                                                                              

15:08 Abdomen/GI: Negative for abdominal pain, nausea, vomiting, diarrhea, and constipation.  kb  

15:08 Constitutional: Positive for body aches, chills, fatigue, fever, malaise.                   

15:08 ENT: Positive for rhinorrhea, sinus congestion.                                             

15:08 Respiratory: Positive for cough.                                                            

15:08 All other systems are negative.                                                             

                                                                                                  

Exam:                                                                                             

15:08 Constitutional:  This is a well developed, well nourished patient who is awake, alert,  kb  

      and in no acute distress. Head/Face:  Normocephalic, atraumatic. ENT:  Moist Mucous         

      membranes Cardiovascular:  Regular rate and rhythm with a normal S1 and S2.  No             

      gallops, murmurs, or rubs.  No pulse deficits. Respiratory:  Respirations even and          

      unlabored. No increased work of breathing. Talking in full sentences Abdomen/GI:  Soft,     

      non-tender. No distention Skin:  Warm, dry with normal turgor.  Normal color. MS/           

      Extremity:  Pulses equal, no cyanosis.  Neurovascular intact.  Full, normal range of        

      motion. Neuro:  Awake and alert, GCS 15, oriented to person, place, time, and               

      situation. Moves all extremities. Normal gait.                                              

                                                                                                  

Vital Signs:                                                                                      

14:10  / 105; Pulse 90; Resp 18; Temp 99.1(TE); Pulse Ox 98% ; Weight 90.72 kg; Height  nj1 

      5 ft. 8 in. ;                                                                               

15:46  / 90; Pulse 89; Resp 15; Temp 98.7(O); Pulse Ox 98% ;                            jl7 

14:10 Body Mass Index 30.41 (90.72 kg, 172.72 cm)                                             nj1 

                                                                                                  

MDM:                                                                                              

13:58 Patient medically screened.                                                             kb  

15:08 Differential Diagnosis: Bronchitis Influenza Upper Respiratory Infection Viral Syndrome kb  

      Pneumonia. Data reviewed: vital signs, nurses notes. I considered the following             

      discharge prescriptions or medication management in the emergency department I              

      discussed and recommended Over The Counter medications, Antibiotics: At this time           

      antibiotics are not recommended. Test considered but Not performed: X-ray: chest x-ray      

      considered, but lungs are clear bilaterally, o2 sat 98%. Counseling: I had a detailed       

      discussion with the patient and/or guardian regarding: the historical points, exam          

      findings, and any diagnostic results supporting the discharge/admit diagnosis, lab          

      results, the need for outpatient follow up, a family practitioner, to return to the         

      emergency department if symptoms worsen or persist or if there are any questions or         

      concerns that arise at home.                                                                

                                                                                                  

                                                                                             

14:11 Order name: Flu; Complete Time: 14:48                                                   kb  

                                                                                             

14:11 Order name: COVID-19 SARS RT PCR; Complete Time: 15:03                                  kb  

                                                                                                  

Administered Medications:                                                                         

14:40 Drug: Ibuprofen  mg Route: PO;                                                    nj1 

15:46 Follow up: Response: Temperature is decreased                                           jl7 

                                                                                                  

                                                                                                  

Disposition Summary:                                                                              

23 15:10                                                                                    

Discharge Ordered                                                                                 

      Location: Home                                                                            

      Condition: Stable                                                                       kb  

      Diagnosis                                                                                   

        - Acute upper respiratory infection, unspecified                                      kb  

      Followup:                                                                               kb  

        - With: Emergency Department                                                               

        - When: As needed                                                                          

        - Reason: Worsening of condition                                                           

      Followup:                                                                               kb  

        - With: Private Physician                                                                  

        - When: 2 - 3 days                                                                         

        - Reason: Recheck today's complaints, Continuance of care, Re-evaluation by your           

      physician                                                                                   

      Discharge Instructions:                                                                     

        - Discharge Summary Sheet                                                             kb  

        - Upper Respiratory Infection, Adult, Easy-to-Read                                    kb  

        - Viral Respiratory Infection, Easy-To-Read                                           kb  

      Forms:                                                                                      

        - Medication Reconciliation Form                                                      kb  

        - Thank You Letter                                                                    kb  

        - Antibiotic Education                                                                kb  

        - Prescription Opioid Use                                                             kb  

        - Patient Portal Instructions                                                         kb  

        - Leadership Thank You Letter                                                         kb  

      Prescriptions:                                                                              

        - Tessalon Perles 100 mg Oral Capsule                                                      

            - take 1 capsule by ORAL route every 8 hours As needed; 15 capsule; Refills: 0,   kb  

      Product Selection Permitted                                                                 

Signatures:                                                                                       

Dispatcher [employer]                           EDAbby Guido, LANDONC                 FNHOPE-Beatrice Jenkins, RN                         RN   nj1                                                  

Ernestina Rosen                            RN   jl7                                                  

                                                                                                  

**************************************************************************************************

## 2023-08-19 ENCOUNTER — HOSPITAL ENCOUNTER (INPATIENT)
Dept: HOSPITAL 97 - ER | Age: 43
LOS: 3 days | Discharge: HOME | DRG: 871 | End: 2023-08-22
Attending: HOSPITALIST | Admitting: INTERNAL MEDICINE
Payer: COMMERCIAL

## 2023-08-19 VITALS — BODY MASS INDEX: 30 KG/M2

## 2023-08-19 DIAGNOSIS — R04.2: ICD-10-CM

## 2023-08-19 DIAGNOSIS — E87.6: ICD-10-CM

## 2023-08-19 DIAGNOSIS — Z28.310: ICD-10-CM

## 2023-08-19 DIAGNOSIS — F17.210: ICD-10-CM

## 2023-08-19 DIAGNOSIS — E11.65: ICD-10-CM

## 2023-08-19 DIAGNOSIS — A41.9: Primary | ICD-10-CM

## 2023-08-19 DIAGNOSIS — R59.1: ICD-10-CM

## 2023-08-19 DIAGNOSIS — J18.9: ICD-10-CM

## 2023-08-19 DIAGNOSIS — J98.4: ICD-10-CM

## 2023-08-19 DIAGNOSIS — I10: ICD-10-CM

## 2023-08-19 LAB
ALBUMIN SERPL BCP-MCNC: 2.5 G/DL (ref 3.4–5)
ALP SERPL-CCNC: 158 U/L (ref 45–117)
ALT SERPL W P-5'-P-CCNC: 40 U/L (ref 16–61)
AST SERPL W P-5'-P-CCNC: 31 U/L (ref 15–37)
BUN BLD-MCNC: 5 MG/DL (ref 7–18)
GLUCOSE SERPLBLD-MCNC: 253 MG/DL (ref 74–106)
HCT VFR BLD CALC: 45 % (ref 39.6–49)
INR BLD: 1.36
LYMPHOCYTES # SPEC AUTO: 1.6 K/UL (ref 0.7–4.9)
MCV RBC: 99 FL (ref 80–100)
PMV BLD: 7.7 FL (ref 7.6–11.3)
POTASSIUM SERPL-SCNC: 3 MEQ/L (ref 3.5–5.1)
RBC # BLD: 4.54 M/UL (ref 4.33–5.43)
WBC # BLD AUTO: 15.5 THOU/UL (ref 4.3–10.9)

## 2023-08-19 PROCEDURE — 80048 BASIC METABOLIC PNL TOTAL CA: CPT

## 2023-08-19 PROCEDURE — 87205 SMEAR GRAM STAIN: CPT

## 2023-08-19 PROCEDURE — 83036 HEMOGLOBIN GLYCOSYLATED A1C: CPT

## 2023-08-19 PROCEDURE — 85018 HEMOGLOBIN: CPT

## 2023-08-19 PROCEDURE — 85610 PROTHROMBIN TIME: CPT

## 2023-08-19 PROCEDURE — 36415 COLL VENOUS BLD VENIPUNCTURE: CPT

## 2023-08-19 PROCEDURE — 84443 ASSAY THYROID STIM HORMONE: CPT

## 2023-08-19 PROCEDURE — 88305 TISSUE EXAM BY PATHOLOGIST: CPT

## 2023-08-19 PROCEDURE — 99285 EMERGENCY DEPT VISIT HI MDM: CPT

## 2023-08-19 PROCEDURE — 87015 SPECIMEN INFECT AGNT CONCNTJ: CPT

## 2023-08-19 PROCEDURE — 96365 THER/PROPH/DIAG IV INF INIT: CPT

## 2023-08-19 PROCEDURE — 84439 ASSAY OF FREE THYROXINE: CPT

## 2023-08-19 PROCEDURE — 87116 MYCOBACTERIA CULTURE: CPT

## 2023-08-19 PROCEDURE — 71045 X-RAY EXAM CHEST 1 VIEW: CPT

## 2023-08-19 PROCEDURE — 87070 CULTURE OTHR SPECIMN AEROBIC: CPT

## 2023-08-19 PROCEDURE — 87635 SARS-COV-2 COVID-19 AMP PRB: CPT

## 2023-08-19 PROCEDURE — 96375 TX/PRO/DX INJ NEW DRUG ADDON: CPT

## 2023-08-19 PROCEDURE — 83605 ASSAY OF LACTIC ACID: CPT

## 2023-08-19 PROCEDURE — 81001 URINALYSIS AUTO W/SCOPE: CPT

## 2023-08-19 PROCEDURE — 85730 THROMBOPLASTIN TIME PARTIAL: CPT

## 2023-08-19 PROCEDURE — 93005 ELECTROCARDIOGRAM TRACING: CPT

## 2023-08-19 PROCEDURE — 80053 COMPREHEN METABOLIC PANEL: CPT

## 2023-08-19 PROCEDURE — 88312 SPECIAL STAINS GROUP 1: CPT

## 2023-08-19 PROCEDURE — 3E043XZ INTRODUCTION OF VASOPRESSOR INTO CENTRAL VEIN, PERCUTANEOUS APPROACH: ICD-10-PCS

## 2023-08-19 PROCEDURE — 86480 TB TEST CELL IMMUN MEASURE: CPT

## 2023-08-19 PROCEDURE — 85025 COMPLETE CBC W/AUTO DIFF WBC: CPT

## 2023-08-19 PROCEDURE — 87206 SMEAR FLUORESCENT/ACID STAI: CPT

## 2023-08-19 PROCEDURE — 87102 FUNGUS ISOLATION CULTURE: CPT

## 2023-08-19 PROCEDURE — 88108 CYTOPATH CONCENTRATE TECH: CPT

## 2023-08-19 PROCEDURE — 96361 HYDRATE IV INFUSION ADD-ON: CPT

## 2023-08-19 PROCEDURE — 96367 TX/PROPH/DG ADDL SEQ IV INF: CPT

## 2023-08-19 PROCEDURE — 82947 ASSAY GLUCOSE BLOOD QUANT: CPT

## 2023-08-19 PROCEDURE — 87040 BLOOD CULTURE FOR BACTERIA: CPT

## 2023-08-19 PROCEDURE — 71275 CT ANGIOGRAPHY CHEST: CPT

## 2023-08-19 PROCEDURE — 87804 INFLUENZA ASSAY W/OPTIC: CPT

## 2023-08-19 PROCEDURE — 80202 ASSAY OF VANCOMYCIN: CPT

## 2023-08-19 PROCEDURE — 76000 FLUOROSCOPY <1 HR PHYS/QHP: CPT

## 2023-08-19 NOTE — RAD REPORT
EXAM DESCRIPTION:  CT - Chest For Pe Angio - 8/19/2023 7:02 pm

 

CLINICAL HISTORY:  SOB;Productive cough;Hemoptysis

 

COMPARISON:  No comparisons

 

TECHNIQUE:  Thin axial CT images of the chest were obtained following administration of 90 mL Isovue 
370 IV contrast. Multiplanar reconstructions, and maximum intensity projection reconstructions were g
enerated and reviewed. Exam utilizes a protocol for optimal evaluation of pulmonary arterial tree.

 

All CT scans are performed using dose optimization technique as appropriate and may include automated
 exposure control or mA/KV adjustment according to patient size.

 

FINDINGS:  Pulmonary arteries are normal. No emboli or other suspicious finding. No acute or signific
ant aorta findings.

 

Small to moderate layering right pleural effusion. multifocal right lower lobe airspace opacities, wi
th the largest being a lobulated lesion with multiple cavitary components including the most sizable 
posteroinferior component that demonstrates an air-fluid level. This measures 6.1 x 5.3 cm in greates
t axial dimensions. A small irregular peripheral right upper lobe nodule with central cavitary focus 
measuring 1 centimeters seen on axial image 89. No pneumothorax.

 

Prominent mediastinal lymph nodes largest in the subcarinal region measuring 2.6 x 1.5 centimeter. Pr
ominent right hilar lymph nodes largest measuring 3.0 x 1.6 cm. No chest wall mass or abnormal axilli
juarez lymphadenopathy.

 

 Healing posterior left eleventh rib fracture

 

IMPRESSION:  No evidence of acute central pulmonary emboli.

 

Multifocal right lower lobe airspace opacities with a dominant masslike lesion demonstrating central 
cavitation, favored to represent cavitary or necrotizing pneumonia. Short-term interval follow-up CT 
in 1-3 months or following resolution of any acute symptoms is recommended to exclude an underlying m
ass.

 

Small irregular peripheral right upper lobe 1 cm nodules with central cavitation, should also be eval
uated on short-term follow-up CT.

 

 

 The findings were communicated to David Page on 8/19/2023 at 19:29 hours.

## 2023-08-19 NOTE — ER
Nurse's Notes                                                                                     

 [facility]                                                                 

Name: Gil Carrasco                                                                                  

Age: 42 yrs                                                                                       

Sex: Male                                                                                         

: 1980                                                                                   

MRN: [id number]                                                                                   

Arrival Date: 2023                                                                          

Time: 17:08                                                                                       

Account#: [id number]                                                                            

Bed 2                                                                                             

Private MD:                                                                                       

Diagnosis: Other pneumonia, unspecified organism;Sepsis, unspecified organism                     

                                                                                                  

Presentation:                                                                                     

                                                                                             

17:18 Chief complaint: Patient states: Coughing mucusy blood since yesterday. Seen here last  nj1 

      week, swab and told he had an upper respiratory infection. Coronavirus screen: Vaccine      

      status: Patient reports being unvaccinated. Ebola Screen: Patient denies travel to an       

      Ebola-affected area in the 21 days before illness onset. Initial Sepsis Screen: Does        

      the patient meet any 2 criteria? HR > 90 bpm. No. Patient's initial sepsis screen is        

      negative. Does the patient have a suspected source of infection? No. Patient's initial      

      sepsis screen is negative. Risk Assessment: Do you want to hurt yourself or someone         

      else? Patient reports no desire to harm self or others. Onset of symptoms was 2023.                                                                                   

17:18 Method Of Arrival: Ambulatory                                                           [facility] 

17:18 Acuity: ARTUR 3                                                                           nj1 

                                                                                                  

Historical:                                                                                       

- Allergies:                                                                                      

17:20 No Known Allergies;                                                                     nj1 

- PMHx:                                                                                           

17:20 Hypertensive disorder;                                                                  nj1 

- PSHx:                                                                                           

17:20 None;                                                                                   nj1 

                                                                                                  

- Immunization history:: Client reports having NOT received the Covid vaccine.                    

- Social history:: Smoking status: Patient reports the use of cigarette tobacco                   

  products, smokes one pack cigarettes per day.                                                   

                                                                                                  

                                                                                                  

Screenin:40 [facility] ED Fall Risk Assessment (Adult) History of falling in the last 3 months,       ko1 

      including since admission No falls in past 3 months (0 pts) Confusion or Disorientation     

      No (0 pts) Intoxicated or Sedated No (0 pts) Impaired Gait No (0 pts) Mobility Assist       

      Device Used No (0 pt) Altered Elimination No (0 pt) Score/Fall Risk Level 0 - 2 = Low       

      Risk Oriented to surroundings, Maintained a safe environment, Educated pt \T\ family on     

      fall prevention, incl call for assistance when getting out of bed, Assessed \T\             

      reinforced patient's understanding of fall precautions, Provided non-skid footwear,         

      Hourly rounding (assess needs \T\ fall precautionary measures) done, Used ambulatory aids   

      as needed (educated on \T\ assisted with), Used gait belt as appropriate. Abuse screen:     

      Denies threats or abuse. Denies injuries from another. Nutritional screening: No            

      deficits noted. Tuberculosis screening: No symptoms or risk factors identified.             

                                                                                                  

Assessment:                                                                                       

17:40 General: Appears in no apparent distress. uncomfortable, Behavior is calm, cooperative, ko1 

      appropriate for age. Pain: Denies pain. Neuro: No deficits noted. Cardiovascular: No        

      deficits noted. Respiratory: Reports cough that is productive. GI: No deficits noted.       

      : No deficits noted. EENT: No deficits noted. Derm: No deficits noted.                    

      Musculoskeletal: No deficits noted.                                                         

19:27 Reassessment: Patient appears in no apparent distress at this time. Patient and/or      [facility] 

      family updated on plan of care and expected duration. Pain level reassessed. Patient is     

      alert, oriented x 3, equal unlabored respirations, skin warm/dry/pink. Patient denies       

      pain at this time.                                                                          

21:40 Reassessment: Patient appears in no apparent distress at this time. No changes from     [facility] 

      previously documented assessment. Patient and/or family updated on plan of care and         

      expected duration. Pain level reassessed. Patient is alert, oriented x 3, equal             

      unlabored respirations, skin warm/dry/pink. Patient denies pain at this time.               

                                                                                                  

Vital Signs:                                                                                      

17:18  / 103; Pulse 125; Resp 18; Temp 100.7(O); Pulse Ox 96% ; Weight 95.25 kg; Height nj1 

      5 ft. 9 in. ; Pain 0/10;                                                                    

18:23  / 98; Pulse 102; Resp 18; Pulse Ox 97% ;                                         ko1 

19:28  / 89; Pulse 98; Resp 24 S; Temp 98.5(O); Pulse Ox 93% on R/A;                    jw7 

20:30  / 97; Pulse 101; Resp 21 S; Pulse Ox 93% on R/A;                                 jw7 

21:30  / 96; Pulse 100; Resp 22 S; Pulse Ox 100% on R/A;                                jw7 

17:18 Body Mass Index 31.01 (95.25 kg, 175.26 cm)                                             nj1 

17:18 Pain Scale: Adult                                                                       [facility] 

                                                                                                  

ED Course:                                                                                        

17:09 Patient arrived in ED.                                                                  ts1 

17:12 David Potter PA is PHCP.                                                                cp  

17:12 Terrance Borrego MD is Attending Physician.                                               cp  

17:20 Triage completed.                                                                       nj1 

17:21 Arm band placed on left wrist.                                                          nj1 

17:27 Milagros Schmidt, RN is Primary Nurse.                                                     ko1 

17:40 Patient has correct armband on for positive identification. Bed in low position. Call   ko1 

      light in reach. Side rails up X 1. Provided Education on: NA. Client placed on              

      continuous cardiac and pulse oximetry monitoring. NIBP monitoring applied. Cardiac          

      monitor on. Door closed. Noise minimized. Lights dimmed. Warm blanket given.                

17:40 Inserted saline lock: 20 gauge in left antecubital area, using aseptic technique. Blood ko1 

      collected.                                                                                  

18:06 COVID-19 SARS RT PCR Sent.                                                              ko1 

18:06 Influenza Screen (a \T\ B) Sent.                                                          ko1

18:06 Blood Culture Adult (2) Sent.                                                           ko1 

18:06 CBC with Diff Sent.                                                                     ko1 

18:06 CMP Sent.                                                                               ko1 

18:06 Lactate w/ 2H reflex if indic. Sent.                                                    ko1 

18:06 Protime (+inr) Sent.                                                                    ko1 

18:06 Ptt, Activated Sent.                                                                    ko1 

18:27 XRAY Chest (1 view) In Process Unspecified.                                             EDMS

19:03 CT Chest For PE Angio In Process Unspecified.                                           EDMS

20:10 Kenneth Wilkinson MD is Hospitalizing Provider.                                            cp  

21:42 Inserted saline lock: 22 gauge hand, using aseptic technique.                           rv  

21:42 No provider procedures requiring assistance completed. Patient admitted, IV remains in  rv  

      place.                                                                                      

                                                                                                  

Administered Medications:                                                                         

18:13 Drug: NS 0.9% IV 1000 ml Route: IV; Rate: 1 bolus; Site: left antecubital;              ko1 

21:41 Follow up: Response: No adverse reaction; No change in condition; IV Status: Completed  jw7 

      infusion; IV Intake: 1000ml                                                                 

18:46 Drug: Rocephin IV 2 grams Route: IV; Rate: calculated rate; Site: left antecubital;     ko1 

22:01 Follow up: Response: No adverse reaction; IV Status: Completed infusion; IV Intake: 13dhru0 

19:49 Drug: Zithromax IVPB 500 mg Route: IVPB; Infused Over: 1 hrs; Site: left antecubital;   rv  

21:42 Follow up: Response: No adverse reaction; IV Status: Completed infusion; IV Intake:     jw7 

      500ml                                                                                       

20:05 CANCELLED (Physician Discretion): levofloxacin IVPB 750 mg 150 ml IVPB once over 90 minscp  

21:29 Drug: Piperacillin-Tazobactam IVPB 3.375 grams Route: IVPB; Infused Over: 60 mins;      jw7 

      Site: left antecubital;                                                                     

22:01 Follow up: Response: No adverse reaction; IV Status: Completed infusion; IV Intake:     jw7 

      100ml                                                                                       

22:01 Drug: vancoMYCIN IVPB 1.5 grams Route: IVPB; Rate: calculated rate; Site: left hand;    jw7 

22:02 Follow up: Response: No adverse reaction; IV Status: Infusion continued upon admission; jw7 

      IV Intake: 10ml                                                                             

                                                                                                  

                                                                                                  

Medication:                                                                                       

21:42 VIS not applicable for this client.                                                     rv  

                                                                                                  

Intake:                                                                                           

21:41 IV: 1000ml; Total: 1000ml.                                                              jw7 

21:42 IV: 500ml; Total: 1500ml.                                                               jw7 

22:01 IV: 20ml; Total: 1520ml.                                                                jw7 

22:01 IV: 100ml; Total: 1620ml.                                                               jw7 

22:02 IV: 10ml; Total: 1630ml.                                                                jw7 

                                                                                                  

Outcome:                                                                                          

20:11 Decision to Hospitalize by Provider.                                                    cp  

22:02 Admitted to Tele accompanied by nurse, family with patient, via wheelchair, room 418,   jw7 

      with chart.                                                                                 

22:02 Condition: stable                                                                           

22:02 Instructed on the need for admit.                                                           

22:02 Patient left the ED.                                                                    jw7 

                                                                                                  

Signatures:                                                                                       

Dispatcher [employer]                           EDMS                                                 

David Potter PA PA   cp                                                   

Oswald Patel RN RN   rv                                                   

Mayi Robles RN RN   jw7                                                  

Milagros Schmidt RN                       RN   ko1                                                  

Beatrice Toney RN RN   nj1                                                  

Markia Watters PAS                     PAS  ts1                                                  

                                                                                                  

**************************************************************************************************

## 2023-08-19 NOTE — EDPHYS
Physician Documentation                                                                           

 [facility]                                                                 

Name: Gil Carrasco                                                                                  

Age: 42 yrs                                                                                       

Sex: Male                                                                                         

: 1980                                                                                   

MRN: [id number]                                                                                   

Arrival Date: 2023                                                                          

Time: 17:08                                                                                       

Account#: [id number]                                                                            

Bed 2                                                                                             

Private MD:                                                                                       

ED Physician Terrance Borrego                                                                        

HPI:                                                                                              

                                                                                             

17:45 This 42 yrs old Male presents to ER via Ambulatory with complaints of Coughing up blood.cp  

17:45 The patient or guardian reports cough, with productive sputum, that is purulent. Onset: cp  

      The symptoms/episode began/occurred 3 week(s) ago. Associated signs and symptoms:           

      Pertinent positives: fever, nausea, vomiting, hemoptysis, Pertinent negatives:              

      diarrhea. Severity of symptoms: in the emergency department the symptoms are unchanged      

      despite home interventions.                                                                 

                                                                                                  

Historical:                                                                                       

- Allergies:                                                                                      

17:20 No Known Allergies;                                                                     nj1 

- PMHx:                                                                                           

17:20 Hypertensive disorder;                                                                  nj1 

- PSHx:                                                                                           

17:20 None;                                                                                   nj1 

                                                                                                  

- Immunization history:: Client reports having NOT received the Covid vaccine.                    

- Social history:: Smoking status: Patient reports the use of cigarette tobacco                   

  products, smokes one pack cigarettes per day.                                                   

                                                                                                  

                                                                                                  

ROS:                                                                                              

17:50 Constitutional: Positive for fever.                                                     cp  

17:50 Eyes: Negative for injury, pain, redness, and discharge.                                cp  

17:50 ENT: Negative for drainage from ear(s), ear pain, sore throat, difficulty swallowing,       

      difficulty handling secretions.                                                             

17:50 Cardiovascular: Negative for edema, palpitations.                                           

17:50 Respiratory: Positive for cough, hemoptysis, shortness of breath.                           

17:50 Abdomen/GI: Positive for nausea and vomiting, Negative for diarrhea, constipation,          

      anorexia.                                                                                   

17:50 Back: Negative for pain at rest, pain with movement.                                        

17:50 Neuro: Negative for altered mental status, dizziness, headache, weakness.                   

17:50 All other systems are negative.                                                             

                                                                                                  

Exam:                                                                                             

17:55 Constitutional: The patient appears in no acute distress, alert, awake,                 cp  

      non-diaphoretic, non-toxic, well developed, well nourished, obviously ill.                  

17:55 Head/Face:  Normocephalic, atraumatic.                                                  cp  

17:55 Eyes: Periorbital structures: appear normal, Conjunctiva: normal, no exudate, no        cp  

      injection, Sclera: no appreciated abnormality, Lids and lashes: appear normal,              

      bilaterally.                                                                                

17:55 ENT: External ear(s): are unremarkable, Nose: is normal, Mouth: Lips: moist, Oral       cp  

      mucosa: pink and intact, moist, Posterior pharynx: is normal, airway is patent, no          

      erythema, no exudate, Voice: is normal.                                                     

17:55 Neck: ROM/movement: is normal, is supple, without pain, no range of motions                 

      limitations, no meningismus.                                                                

17:55 Chest/axilla: Inspection: normal.                                                           

17:55 Cardiovascular: Rate: tachycardic, Rhythm: regular, Edema: is not appreciated, JVD: is      

      not appreciated.                                                                            

17:55 Respiratory: the patient does not display signs of respiratory distress,  Respirations:     

      labored breathing, is not present, intercostal retractions, are absent, Breath sounds:      

      bronchial sounds, that are mild, are heard diffusely, stridor, is not appreciated,          

      wheezing: is not appreciated.                                                               

17:55 Abdomen/GI: Inspection: abdomen appears normal, Palpation: abdomen is soft and              

      non-tender, in all quadrants.                                                               

17:55 Back: vertebral tenderness, is not appreciated.                                             

17:55 Neuro: Orientation: to person, place \T\ time. Mentation: is normal, Motor: moves all       

      fours, strength is normal, Sensation: is normal.                                            

18:08 ECG was reviewed by the Attending Physician.                                              

                                                                                                  

Vital Signs:                                                                                      

17:18  / 103; Pulse 125; Resp 18; Temp 100.7(O); Pulse Ox 96% ; Weight 95.25 kg; Height nj1 

      5 ft. 9 in. ; Pain 0/10;                                                                    

18:23  / 98; Pulse 102; Resp 18; Pulse Ox 97% ;                                         ko1 

19:28  / 89; Pulse 98; Resp 24 S; Temp 98.5(O); Pulse Ox 93% on R/A;                    jw7 

20:30  / 97; Pulse 101; Resp 21 S; Pulse Ox 93% on R/A;                                 jw7 

21:30  / 96; Pulse 100; Resp 22 S; Pulse Ox 100% on R/A;                                jw7 

17:18 Body Mass Index 31.01 (95.25 kg, 175.26 cm)                                             nj 

17:18 Pain Scale: Adult                                                                       nj 

                                                                                                  

MDM:                                                                                              

17:27 Patient medically screened.                                                               

20:00 Data reviewed: vital signs, nurses notes, lab test result(s), EKG, radiologic studies,  cp  

      CT scan, plain films.                                                                       

20:00 Antibiotic administration: Rocephin and Zithromax given.                                  

20:05 Management of patient was discussed with the following: Consultant: DR Triana who      cp  

      wants Vancomycin and Zosyn antibiotics to be given and will consult. Admit to               

      hospitalist services.                                                                       

20:10 Management of patient was discussed with the following: Hospitalist: Eric Cormier NP       

      will admit after discussion.                                                                

20:10 I considered the following discharge prescriptions or medication management in the        

      emergency department Medications were administered in the Emergency Department. See         

      MAR. Independent interpretation of the following test(s) in the Emergency Department        

      X-Ray: My interpretation is image of chest shows consolidation right lung. Care             

      significantly affected by the following chronic conditions: Hypertension. Counseling: I     

      had a detailed discussion with the patient and/or guardian regarding the historical         

      points, exam findings, and any diagnostic results supporting the discharge/admit            

      diagnosis, lab results, radiology results, the need for further work-up and treatment       

      in the hospital. Response to treatment: the patient's symptoms have markedly improved       

      after treatment.                                                                            

                                                                                                  

 Order name: Blood Culture Adult (2)                                                       

29 Order name: CBC with Diff; Complete Time: 18:37                                           

                                                                                             

18:37 Interpretation: Normal except: WBC 15.50; SHARA% 81.7; LYM% 10.2; NEUT A 12.7.              

                                                                                             

17:29 Order name: CMP; Complete Time: 18:37                                                     

                                                                                             

18:38 Interpretation: Normal except: ; K 3.0; GLUC 253; BUN 5; ; CA 8.4; ALB     cp  

      2.5; GLOB 5.3; A/G 0.5.                                                                     

17:29 Order name: Lactate w/ 2H reflex if indic.; Complete Time: 18:37                          

                                                                                             

18:38 Interpretation: Reviewed.                                                                 

 Order name: Protime (+inr); Complete Time: 18:37                                          

                                                                                             

18:38 Interpretation: Abnormal: PT 15.0.                                                        

 Order name: Ptt, Activated; Complete Time: 18:37                                          

 Order name: Urinalysis w/ reflexes                                                        

29 Order name: Influenza Screen (a \T\ B); Complete Time: 18:37                              cp

                                                                                             

17:29 Order name: COVID-19 SARS RT PCR; Complete Time: 19:30                                  cp  

                                                                                             

20:08 Order name: Acid Fast Bacilli Culture                                                   EDMS

                                                                                             

20:08 Order name: Sputum Culture                                                              EDMS

                                                                                             

20:08 Order name: Sputum Gram Stain                                                           EDMS

                                                                                             

20:08 Order name: QUANTIFERON TB GOLD PLUS                                                    EDMS

                                                                                             

18:07 Order name: XRAY Chest (1 view); Complete Time: 19:30                                   cp  

                                                                                             

19:30 Interpretation: Report review.                                                            

                                                                                             

18:41 Order name: CT Chest For PE Angio; Complete Time: 19:54                                 cp  

                                                                                             

17:29 Order name: EKG; Complete Time: 17:29                                                   cp  

                                                                                             

17:29 Order name: Accucheck; Complete Time: 19:11                                             cp  

                                                                                             

17:29 Order name: Cardiac monitoring; Complete Time: 18:06                                    cp  

                                                                                             

17:29 Order name: EKG - Nurse/Tech; Complete Time: 18:06                                      cp  

                                                                                             

17:29 Order name: IV Saline Lock - Large Bore; Complete Time: 18:06                           cp  

                                                                                             

17:29 Order name: Labs collected and sent; Complete Time: 18:06                               cp  

                                                                                             

17:29 Order name: O2 Per Protocol; Complete Time: 18:00                                       cp  

                                                                                             

17:29 Order name: O2 Sat Monitoring; Complete Time: 18:00                                     cp  

                                                                                             

17:29 Order name: Vital Signs; Complete Time: 18:00                                           cp  

                                                                                                  

EC:08 Rate is 105 beats/min. Rhythm is regular. NH interval is normal. QRS interval is        cp  

      normal. QT interval is normal. T waves are Inverted in lead aVR. Interpreted by me.         

      Reviewed by me.                                                                             

                                                                                                  

Administered Medications:                                                                         

18:13 Drug: NS 0.9% IV 1000 ml Route: IV; Rate: 1 bolus; Site: left antecubital;              ko1 

21:41 Follow up: Response: No adverse reaction; No change in condition; IV Status: Completed  jw7 

      infusion; IV Intake: 1000ml                                                                 

18:46 Drug: Rocephin IV 2 grams Route: IV; Rate: calculated rate; Site: left antecubital;     ko1 

22:01 Follow up: Response: No adverse reaction; IV Status: Completed infusion; IV Intake: [id number] 

19:49 Drug: Zithromax IVPB 500 mg Route: IVPB; Infused Over: 1 hrs; Site: left antecubital;   rv  

21:42 Follow up: Response: No adverse reaction; IV Status: Completed infusion; IV Intake:     jw7 

      500ml                                                                                       

20:05 CANCELLED (Physician Discretion): levofloxacin IVPB 750 mg 150 ml IVPB once over 90 minscp  

21:29 Drug: Piperacillin-Tazobactam IVPB 3.375 grams Route: IVPB; Infused Over: 60 mins;      jw7 

      Site: left antecubital;                                                                     

22:01 Follow up: Response: No adverse reaction; IV Status: Completed infusion; IV Intake:     jw7 

      100ml                                                                                       

22:01 Drug: vancoMYCIN IVPB 1.5 grams Route: IVPB; Rate: calculated rate; Site: left hand;    jw7 

22:02 Follow up: Response: No adverse reaction; IV Status: Infusion continued upon admission; jw7 

      IV Intake: 10ml                                                                             

                                                                                                  

                                                                                                  

Disposition Summary:                                                                              

23 20:11                                                                                    

Hospitalization Ordered                                                                           

      Hospitalization Status: Inpatient Admission                                             cp  

      Provider: Kenneth Wilkinson cp  

      [facility]                                                 cp  

      Condition: Stable                                                                       cp  

      Problem: new                                                                            cp  

      Symptoms: have improved                                                                 cp  

      Bed/Room Type: Standard                                                                 cp  

      Room Assignment: 418(23 21:28)                                                    cg  

      Diagnosis                                                                                   

        - Other pneumonia, unspecified organism                                               cp  

        - Sepsis, unspecified organism                                                        cp  

      Forms:                                                                                      

        - Medication Reconciliation Form                                                      cp  

        - SBAR form                                                                           cp  

        - Leadership Thank You Letter                                                         cp  

Signatures:                                                                                       

Dispatcher [employer]                           David Mattson PA PA   cp                                                   

Manisha Engle RN                       RN                                                      

Oswald Patel RN RN rv Waits, Jodi, RN                         RN   jw7                                                  

Milagros Schmidt RN                       RN   ko1                                                  

Beatrice Toney RN                         RN   nj1                                                  

                                                                                                  

Corrections: (The following items were deleted from the chart)                                    

20:05 20:03 levofloxacin IVPB 750 mg 150 ml IVPB once over 90 mins ordered. cp                cp  

21:28 20:11 cp                                                                                cg  

                                                                                                  

**************************************************************************************************

## 2023-08-19 NOTE — RAD REPORT
[facility]

 

CLINICAL HISTORY:  COUGH

 

COMPARISON:  Chest Single View dated 3/19/2020; Chest Pa And Lat (2 Views) dated 2/27/2020; CHEST PA 
AND LAT 2 VIEW dated 6/18/2009; Chest For Pe Angio dated 8/19/2023

 

TECHNIQUE:   Portable AP view of the chest.

 

FINDINGS:  Right hilar masslike ovoid opacity, with adjacent mild patchy opacification. Left lung is 
clear. No pneumothorax or effusion. The cardiomediastinal contours are unremarkable.

 

IMPRESSION:  Right hilar masslike ovoid opacity, could represent a mass or focal airspace disease.

## 2023-08-19 NOTE — P.HP
Certification for Inpatient


Patient admitted to: Inpatient


With expected LOS: >2 Midnights


Patient will require the following post-hospital care: None


Practitioner: I am a practitioner with admitting privileges, knowledge of 

patient current condition, hospital course, and medical plan of care.


Services: Services provided to patient in accordance with Admission requirements

found in Title 42 Section 412.3 of the Code of Federal Regulations





Patient History


Date of Service: 08/19/23


Reason for admission: Sepsis, pneumonia, hemoptysis


History of Present Illness: 





42-year-old male with history of hypertension presents emergency department 

chief complaint of hemoptysis.  He reports for the last 1 month he has been 

having cough, low-grade fevers, chills and feeling generally unwell but for the 

last 2 to 3 days has been having a small amount of hemoptysis worsening today.  

He said 2-3 episodes a day where he is coughing up primarily blood mixed with a 

small amount of sputum approximately a tablespoon at a time.  He was evaluated 

in the emergency department his labs are significant for white blood cell 15.5 

hemoglobin 15 hematocrit 45 sodium 132 potassium 3.0 glucose 253 lactic acid 1.6

blood cultures obtained CTA of the chest showed multifocal right lower lobe 

airspace opacities with a dominant masslike lesion demonstrating central 

cavitation, favored to represent cavitary necrotizing pneumonia.  Short interval

follow-up CT in 1 to 3 months or following resolution of any acute symptoms 

recommended to exclude underlying mass.  Small irregular peripheral right upper 

lobe 1 cm nodule with central cavitation should also be evaluated on short-term 

CT.  ED physician spoke with pulmonology who recommends patient be started on 

vancomycin, cefepime and admitted to the hospital.





- Past Medical/Surgical History


-: Hypertension


-: None


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 

with an office job





- Family History


  ** Mother


-: Diabetes





  ** Father


-: Heart disease, Stroke





- Social History


Smoking Status: Current every day smoker


Counseled patient to stop smoking for: less than 10 minutes


Smoking therapy provided: Yes


Place of Residence: Home





Review of Systems


10-point ROS is otherwise unremarkable


General: Fever, Chills, Malaise


Respiratory: Cough, Shortness of Breath, Hemoptysis, SOB with Excertion





Physical Examination





- Physical Exam


General: Alert, In no apparent distress, Oriented x3


HEENT: Atraumatic, PERRLA, Mucous membr. moist/pink, EOMI, Sclerae nonicteric


Neck: Supple, 2+ carotid pulse no bruit, No LAD, Without JVD or thyroid 

abnormality


Respiratory: Diminished, Crackles/rales


Cardiovascular: Regular rate/rhythm, Normal S1 S2


Capillary refill: <2 Seconds


Gastrointestinal: Normal bowel sounds, No tenderness


Musculoskeletal: No tenderness


Integumentary: No rashes


Neurological: Normal speech, Normal strength at 5/5 x4 extr, Normal tone, Normal

affect





- Studies


Laboratory Data (last 24 hrs)











  08/19/23 08/19/23 08/19/23





  17:55 17:55 17:55


 


WBC    15.50 H


 


Hgb    15.1


 


Hct    45.0


 


Plt Count    348


 


PT  15.0 H  


 


INR  1.36  


 


APTT  34.9  


 


Sodium   132 L 


 


Potassium   3.0 L 


 


BUN   5 L 


 


Creatinine   0.83 


 


Glucose   253 H 


 


Total Bilirubin   0.8 


 


AST   31 


 


ALT   40 


 


Alkaline Phosphatase   158 H 








Microbiology Data (last 24 hrs): 








08/19/23 17:55   Nasopharnyx   Influenza Type A Antigen Screen - Final


08/19/23 17:55   Nasopharnyx   Influenza Type B Antigen Screen - Final








Assessment and Plan





- Plan


Assessment:


Sepsis secondary to suspected cavitary/necrotizing pneumonia versus mass right 

lower lobe


Hemoptysis


Hyperglycemia


Hypertension








Plan:


Sepsis secondary to suspected cavitary/necrotizing pneumonia versus mass right 

lower lobe


Hemoptysis


SIRS criteria present including leukocytosis, tachycardia.  Source of infection 

confirmed on CT scan with suspected pneumonia right lower lobe.  Lactate less 

than 2, blood cultures obtained.  Continue broad-spectrum antibiotics 

vancomycin, Zosyn per pulmonology.  Sputum culture, sputum Gram stain, 

TB/QuantiFERON gold, AFB test ordered.  Continue with airborne isolation for 

now.  Symptoms for about a month now, recently developed hemoptysis.  About a 

tablespoon at a time, we will continue to monitor recheck H&H tonight.  

Pulmonology consult.


Hyperglycemia


Not a known diabetic, will obtain A1c in the morning, ACHS Accu-Chek, sliding 

scale insulin.


Hypertension


Continue home medications as appropriate.








DVT PPX: SCD


Code status: Full


Discharge Plan: Home


Plan to discharge in: 72 Hours





- Advance Directives


Does patient have a Living Will: No


Does patient have a Durable POA for Healthcare: No





- Code Status/Comfort Care


Code Status Assessed: Yes (Full)


Critical Care: No


Time Spent Managing Pts Care (In Minutes): 55

## 2023-08-20 LAB
BUN BLD-MCNC: 4 MG/DL (ref 7–18)
GLUCOSE SERPLBLD-MCNC: 233 MG/DL (ref 74–106)
HCT VFR BLD CALC: 40.3 % (ref 39.6–49)
LYMPHOCYTES # SPEC AUTO: 1.2 K/UL (ref 0.7–4.9)
MCV RBC: 98.9 FL (ref 80–100)
PMV BLD: 7.4 FL (ref 7.6–11.3)
POTASSIUM SERPL-SCNC: 3.1 MEQ/L (ref 3.5–5.1)
RBC # BLD: 4.08 M/UL (ref 4.33–5.43)
TSH SERPL DL<=0.05 MIU/L-ACNC: 1.45 UIU/ML (ref 0.36–3.74)
WBC # BLD AUTO: 13.4 THOU/UL (ref 4.3–10.9)

## 2023-08-20 RX ADMIN — Medication SCH ML: at 09:00

## 2023-08-20 RX ADMIN — BENZONATATE PRN MG: 100 CAPSULE ORAL at 00:34

## 2023-08-20 RX ADMIN — ACETAMINOPHEN PRN MG: 500 TABLET, FILM COATED ORAL at 17:48

## 2023-08-20 RX ADMIN — Medication SCH ML: at 00:34

## 2023-08-20 RX ADMIN — NICOTINE SCH MG: 21 PATCH TRANSDERMAL at 09:36

## 2023-08-20 RX ADMIN — SODIUM CHLORIDE SCH MLS: 0.9 INJECTION, SOLUTION INTRAVENOUS at 09:36

## 2023-08-20 RX ADMIN — BENZONATATE PRN MG: 100 CAPSULE ORAL at 21:43

## 2023-08-20 RX ADMIN — SODIUM CHLORIDE SCH MLS: 9 INJECTION, SOLUTION INTRAVENOUS at 21:26

## 2023-08-20 RX ADMIN — Medication SCH ML: at 21:26

## 2023-08-20 RX ADMIN — ACETAMINOPHEN PRN MG: 500 TABLET, FILM COATED ORAL at 06:26

## 2023-08-20 RX ADMIN — BENZONATATE PRN MG: 100 CAPSULE ORAL at 09:36

## 2023-08-20 RX ADMIN — NICOTINE SCH MG: 21 PATCH TRANSDERMAL at 00:34

## 2023-08-20 RX ADMIN — SODIUM CHLORIDE SCH MLS: 0.9 INJECTION, SOLUTION INTRAVENOUS at 21:25

## 2023-08-20 RX ADMIN — SODIUM CHLORIDE SCH MLS: 9 INJECTION, SOLUTION INTRAVENOUS at 06:14

## 2023-08-20 RX ADMIN — SODIUM CHLORIDE SCH MLS: 9 INJECTION, SOLUTION INTRAVENOUS at 13:00

## 2023-08-20 NOTE — P.PN
Date of Service: 08/21/23





Subjective:





ROS: 


10 point ROS as noted above, otherwise negative





Physical Exam:


Gen: Alert, Oriented, NAD


HEENT: normal conjunctiva, sclera anicteric


CV: regular rate & rhythm, no edema


Pulm: labored respirations on room air,  Diminished, Rhonchi/gurgles


Abd: soft, nontender, nondistended


MSK: no joint tenderness


Integumentary: No rashes 


Neuro: normal speech, normal affect





Problem List:


1. Sepsis secondary to Cavitary/Necrotizing Multifocal Pneumonia


2. Possible Right Lower Lobe Lung Mass


3. Prominent Mediastinal/Right Hilar Lymphadenopathy


4. Small-Moderate Right Pleural Effusion


5. Hyperglycemia 


6. Hypertension





PLAN


CTA chest: no PE. Multifocal right lower lobe airspace opacities with a dominant

masslike lesion demonstrating central cavitation, favored to represent cavitary 

or necrotizing pneumonia. Short-term interval follow-up CT in 1-3 months or 

following resolution of any acute symptoms is recommended to exclude an 

underlying mass.


Small irregular peripheral right upper lobe 1 cm nodules with central 

cavitation, should also be evaluated on short-term follow-up CT.


Pulmonology consulted 


continued IV Vancomycin / zosyn 


ID consulted 


blood cx: NGTD


Sputum cx: pending 


Airborne precautions


Denies history of diabetes mellitus


Ordered HgbA1c


Correction scale insulin


Hold home benazepril given cough


PRN anti-hypertensives





VTE: SCD


Code: Full


Dispo: Home

## 2023-08-20 NOTE — P.PN
Subjective


Date of Service: 08/20/23


Chief Complaint: Sepsis, pneumonia, hemoptysis


No acute events since admission. He reports shortness of breath, cough, and 

hemoptysis. He reports previous incarceration for about 1.5 years, which was 8 

years ago. To his knowledge, he has no known exposure to tuberculosis. He 

reports a 28 pack year smoking history. He denies any chest pain or 

palpitations.





Review of Systems


10-point ROS is otherwise unremarkable


Respiratory: Cough, Shortness of Breath, Hemoptysis





Physical Examination





- Vital Signs


Temperature: 96.8 F


Blood Pressure: 139/84


Pulse: 88


Respirations: 18


Pulse Ox (%): 95





- Physical Exam


General: Alert, In no apparent distress, Oriented x3


HEENT: Atraumatic, Mucous membr. moist/pink, Sclerae nonicteric


Neck: JVD not distended


Respiratory: Diminished, Rhonchi/gurgles


Cardiovascular: No edema, Regular rate/rhythm, No murmurs


Gastrointestinal: Normal bowel sounds, Soft and benign, Non-distended, No 

tenderness


Musculoskeletal: No clubbing


Integumentary: No rashes


Neurological: Normal speech, Normal affect





- Studies


Laboratory Data (last 24 hrs)











  08/19/23 08/19/23 08/19/23





  17:55 17:55 17:55


 


WBC    15.50 H


 


Hgb    15.1


 


Hct    45.0


 


Plt Count    348


 


PT  15.0 H  


 


INR  1.36  


 


APTT  34.9  


 


Sodium   132 L 


 


Potassium   3.0 L 


 


BUN   5 L 


 


Creatinine   0.83 


 


Glucose   253 H 


 


Total Bilirubin   0.8 


 


AST   31 


 


ALT   40 


 


Alkaline Phosphatase   158 H 








Microbiology Data (last 24 hrs): 








08/19/23 17:55   Nasopharnyx   Influenza Type A Antigen Screen - Final


08/19/23 17:55   Nasopharnyx   Influenza Type B Antigen Screen - Final








Assessment And Plan





- Plan


# Sepsis secondary to Cavitary/Necrotizing Multifocal Pneumonia


# Possible Right Lower Lobe Lung Mass


# Prominent Mediastinal/Right Hilar Lymphadenopathy


# Small-Moderate Right Pleural Effusion


He met sepsis criteria based on temperature > 100.4 F, HR > 90 bpm, RR > 20 

breaths/min, and WBC > 12,000, and the suspected source is pulmonary.  


- Radiology:


   - Chest x-ray = "right hilar masslike ovoid opacity, could represent a mass 

or focal airspace disease."


   - CT chest angiogram = "no evidence of acute central pulmonary emboli. 

Multifocal right lower lobe airspace opacities with a dominant masslike lesion 

demonstrating central cavitation, favored to represent cavitary or necrotizing 

pneumonia. Short-term interval follow-up CT in 1-3 months or following 

resolution of any acute symptoms is recommended to exclude an underlying mass. 

Small irregular peripheral right upper lobe 1 cm nodules with central 

cavitation, should also be evaluated on short-term follow-up CT."


- Pulmonology consulted and spoke with Dr. Silver - recommendations 

appreciated


   - Recommended continued IV antibiotics


- Infectious Diseases consulted and spoke with Dr. Coleman - recommendations 

appreciated


   - Recommended sputum culture, Gram stain, QuantiFERON gold, AFB


- Sepsis order set was initiated 


   - Initial Lactate was 1.6 


   - Blood cultures drawn before antibiotics were given 


   - Broad spectrum antibiotics started: vancomycin + piperacillin-tazobactam 


   - In regards to fluids: 


      - 30 mL/kg of IV fluids was not administered given SBP > 90, MAP > 65, 

lactic acid < 4  


- Airborne precautions





# Hyperglycemia 


- Denies history of diabetes mellitus


- Ordered HgbA1c


- Correction scale insulin





# Hypertension


- Hold home benazepril given cough


- PRN anti-hypertensives








Kenneth Wilkinson M.D.

## 2023-08-20 NOTE — P.CNS
Date of Consult: 08/20/23


Reason for Consult: Abnormal chest x-ray possible pneumonia


Chief Complaint: Sepsis, pneumonia, hemoptysis


History of Present Illness: 





Is 42 years of age sick for about a month has moved from [place] was 

initially coughing up some phlegm 2 weeks ago came into the emergency room and 

was discharged with diagnosis of upper respiratory tract viral infection became 

worse started having shanta hemoptysis here in the hospital and was found to have

a right lung pneumonia put on antibiotics and is an active smoker 1 pack a day 

planing of some fever and chills


Allergies





No Known Allergies Allergy (Unverified 08/19/23 23:39)


   





Home Medications: 








lisinopriL [Lisinopril] 20 mg DAILY 08/19/23 








- Past Medical/Surgical History


-: Hypertension


-: None


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 

with an office job





- Family History


  ** Mother


Medical History: Diabetes





  ** Father


Medical History: Heart disease, Stroke





- Social History


Smoking Status: Current every day smoker


Alcohol use: No


CD- Drugs: No


Caffeine use: Yes


Place of Residence: Home





Review of Systems


Unremarkable


General: Weakness


Respiratory: Cough, Hemoptysis





Physical Examination














Temp Pulse Resp BP Pulse Ox


 


 100.0 F   102 H  16   137/91 H  94 


 


 08/20/23 06:26  08/20/23 04:00  08/20/23 04:00  08/20/23 04:00  08/20/23 04:00








General: Alert, In no apparent distress, Oriented x3


HEENT: Atraumatic


Neck: Supple


Respiratory: Clear to auscultation bilaterally, Diminished, Crackles/rales 

(Right base)


Cardiovascular: No edema, Normal pulses, Regular rate/rhythm


Laboratory Data (last 24 hrs)











  08/19/23 08/19/23 08/19/23





  17:55 17:55 17:55


 


WBC    15.50 H


 


Hgb    15.1


 


Hct    45.0


 


Plt Count    348


 


PT  15.0 H  


 


INR  1.36  


 


APTT  34.9  


 


Sodium   132 L 


 


Potassium   3.0 L 


 


BUN   5 L 


 


Creatinine   0.83 


 


Glucose   253 H 


 


Total Bilirubin   0.8 


 


AST   31 


 


ALT   40 


 


Alkaline Phosphatase   158 H 











- Problems


(1) Right lower lobe pneumonia


Current Visit: Yes   Status: Acute   


Plan: 


Patient is 42 years of age admitted with I suspect an acute right lower lobe 

pneumonia with mild area of cavitation elevated white count x-ray CAT scan 

reviewed also has some fever White count is declining.  Cultures are pending new

 with IV antibiotics for now mycin and Zosyn once his white count is down can 

switch over to dose p.o. levofloxacin doxycycline is not at risk for resistant 

infection he is also complaining of hemoptysis will need to follow-up in about 2

 to 4 weeks sure that his symptoms are resolving smoker is a risk for malignancy

 tuberculosis is not in the right area


Qualifiers: 


   Pneumonia type: due to unspecified organism   Qualified Code(s): J18.9 - 

Pneumonia, unspecified organism

## 2023-08-21 LAB
BUN BLD-MCNC: 4 MG/DL (ref 7–18)
GLUCOSE SERPLBLD-MCNC: 176 MG/DL (ref 74–106)
HCT VFR BLD CALC: 40 % (ref 39.6–49)
LYMPHOCYTES # SPEC AUTO: 1.4 K/UL (ref 0.7–4.9)
MCV RBC: 98.7 FL (ref 80–100)
PMV BLD: 7.5 FL (ref 7.6–11.3)
POTASSIUM SERPL-SCNC: 2.9 MEQ/L (ref 3.5–5.1)
RBC # BLD: 4.05 M/UL (ref 4.33–5.43)
WBC # BLD AUTO: 11.8 THOU/UL (ref 4.3–10.9)

## 2023-08-21 RX ADMIN — POTASSIUM CHLORIDE SCH MLS: 200 INJECTION, SOLUTION INTRAVENOUS at 14:05

## 2023-08-21 RX ADMIN — SODIUM CHLORIDE SCH MLS: 9 INJECTION, SOLUTION INTRAVENOUS at 04:53

## 2023-08-21 RX ADMIN — Medication SCH: at 21:00

## 2023-08-21 RX ADMIN — DEXTROMETHORPHAN HYDROBROMIDE AND PROMETHAZINE HYDROCHLORIDE SCH ML: 15; 6.25 SOLUTION ORAL at 20:49

## 2023-08-21 RX ADMIN — METFORMIN HYDROCHLORIDE SCH MG: 500 TABLET, FILM COATED ORAL at 16:44

## 2023-08-21 RX ADMIN — DEXTROMETHORPHAN HYDROBROMIDE AND PROMETHAZINE HYDROCHLORIDE SCH ML: 15; 6.25 SOLUTION ORAL at 14:44

## 2023-08-21 RX ADMIN — SODIUM CHLORIDE SCH MLS: 0.9 INJECTION, SOLUTION INTRAVENOUS at 09:20

## 2023-08-21 RX ADMIN — POTASSIUM CHLORIDE SCH MLS: 200 INJECTION, SOLUTION INTRAVENOUS at 11:25

## 2023-08-21 RX ADMIN — NICOTINE SCH MG: 21 PATCH TRANSDERMAL at 09:21

## 2023-08-21 RX ADMIN — DEXTROMETHORPHAN HYDROBROMIDE AND PROMETHAZINE HYDROCHLORIDE SCH ML: 15; 6.25 SOLUTION ORAL at 09:20

## 2023-08-21 RX ADMIN — SODIUM CHLORIDE SCH MLS: 0.9 INJECTION, SOLUTION INTRAVENOUS at 20:48

## 2023-08-21 RX ADMIN — POTASSIUM CHLORIDE SCH MLS: 200 INJECTION, SOLUTION INTRAVENOUS at 17:49

## 2023-08-21 RX ADMIN — SODIUM CHLORIDE SCH MLS: 9 INJECTION, SOLUTION INTRAVENOUS at 20:49

## 2023-08-21 RX ADMIN — BENZONATATE PRN MG: 100 CAPSULE ORAL at 09:20

## 2023-08-21 RX ADMIN — BENZONATATE PRN MG: 100 CAPSULE ORAL at 20:49

## 2023-08-21 RX ADMIN — Medication SCH ML: at 09:00

## 2023-08-21 RX ADMIN — SODIUM CHLORIDE SCH MLS: 9 INJECTION, SOLUTION INTRAVENOUS at 14:04

## 2023-08-21 NOTE — EKG
Test Date:    2023-08-19               Test Time:    18:02:11

Technician:   DARIAN                                    

                                                     

MEASUREMENT RESULTS:                                       

Intervals:                                           

Rate:         105                                    

NJ:           138                                    

QRSD:         82                                     

QT:           366                                    

QTc:          483                                    

Axis:                                                

P:            56                                     

NJ:           138                                    

QRS:          42                                     

T:            40                                     

                                                     

INTERPRETIVE STATEMENTS:                                       

                                                     

Sinus tachycardia

Cannot rule out Anterior infarct, age undetermined

Abnormal ECG

No previous ECG available for comparison



Electronically Signed On 08-21-23 17:57:48 CDT by Rob Zamarripa

## 2023-08-21 NOTE — RAD REPORT
EXAM DESCRIPTION:  RAD - Chest Single View - 8/21/2023 10:38 am

 

CLINICAL HISTORY:  pneumonia

 

COMPARISON:  Chest Single View dated 8/19/2023; Chest Single View dated 3/19/2020; Chest Pa And Lat (
2 Views) dated 2/27/2020; CHEST PA AND LAT 2 VIEW dated 6/18/2009; Chest For Pe Angio dated 8/19/2023


 

FINDINGS:  Lines: None.

Lungs: Right perihilar masslike opacity is slightly decreased in size. The cavitary component has pro
bably decreased since 08/19/2023.

Pleural: No significant pleural effusions or pneumothorax.

Cardiac: The heart size is within normal limits.

Mediastinum: Within normal limits.

Bones: No acute fractures.

Other: None

 

IMPRESSION:  Very slight decrease in size of the right perihilar mass likely representing some decrea
se in the cavitary component of the mass. The differential remains the same. Pneumonia favored but co
ntinued short term follow-up recommended to exclude mass.

## 2023-08-21 NOTE — P.PN
Subjective


Date of Service: 08/21/23


Chief Complaint: Sepsis, pneumonia, hemoptysis





Patient is still complaining of hemoptysis some right-sided chest pain





Review of Systems


General: Weakness


Respiratory: Cough, Hemoptysis


Cardiovascular: Chest Pain





Physical Examination





- Vital Signs


Temperature: 98.4 F


Blood Pressure: 144/84


Pulse: 80


Respirations: 16


Pulse Ox (%): 93





- Physical Exam


General: Alert


Respiratory: Clear to auscultation bilaterally, Diminished


Cardiovascular: No edema, Normal pulses


Gastrointestinal: Normal bowel sounds, Soft and benign





Assessment And Plan





- Current Problems (Diagnosis)


(1) Right lower lobe pneumonia


Current Visit: Yes   Status: Acute   


Plan: 


Patient admitted with right lower lobe pneumonia is to be superior segment 

complaining of hemoptysis plan to do a bronchoscopy tomorrow chest x-ray no 

change in his mild hypokalemia patient's hemoglobin A1c is significantly 

elevated continue with present antibiotic therapy plan to do a bronchoscopy 

tomorrow tuberculosis patient is afebrile scheduled cough syrup appears to have 

a necrotic right lower lobe pneumonia cultures pending


Qualifiers: 


   Pneumonia type: due to unspecified organism   Qualified Code(s): J18.9 - 

Pneumonia, unspecified organism   





(2) Diabetes


Current Visit: Yes   Status: Acute   


Plan: 


His blood sugar is elevated globin A1c is also elevated start on metformin


Qualifiers: 


   Diabetes mellitus type: type 2   Diabetes mellitus complication status: with 

other specified complication

## 2023-08-21 NOTE — P.CNS
Date of Consult: 08/21/23


Reason for Consult: cavitary lung lesion


Chief Complaint: Sepsis, pneumonia, hemoptysis


History of Present Illness: 





Patient is a 41 yo male with a history of hypertension who presented to the ED 

with complaints of hemoptysis for 3 days. CT Chest on 8/19 showing "Multifocal 

right lower lobe airspace opacities with a dominant masslike lesion 

demonstrating central cavitation, favored to represent cavitary or necrotizing 

pneumonia. Small irregular peripheral right upper lobe 1 cm nodules with central

cavitation." Patient was started on empiric antibiotics. ID and pulmonolgy 

consulted. 





Allergies





No Known Allergies Allergy (Unverified 08/19/23 23:39)


   





Home medications list reviewed: Yes


Home Medications: 








lisinopriL [Lisinopril] 20 mg DAILY 08/19/23 








- Past Medical/Surgical History


-: Hypertension


-: None


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 

with an office job





- Family History


  ** Mother


Medical History: Diabetes





  ** Father


Medical History: Heart disease, Stroke





- Social History


Smoking Status: Current every day smoker


Smoking therapy provided: Yes


Patient receptive to therapy: Yes


Alcohol use: No


CD- Drugs: No


Caffeine use: Yes


Place of Residence: Home





Review of Systems


10-point ROS is otherwise unremarkable


Respiratory: Cough (x 1 month), Hemoptysis (x 3 days)





Physical Examination














Temp Pulse Resp BP Pulse Ox


 


 98.4 F   80   16   144/84 H  93 


 


 08/21/23 08:00  08/21/23 08:00  08/21/23 08:00  08/21/23 08:00  08/21/23 08:00








General: Alert, In no apparent distress, Oriented x3


HEENT: Atraumatic, Normocephalic


Neck: Supple, JVD not distended


Respiratory: Normal air movement (on room air), Crackles/rales (Right)


Cardiovascular: No edema, Normal pulses, Regular rate/rhythm


Gastrointestinal: Normal bowel sounds, Soft and benign


Musculoskeletal: No clubbing, No swelling


Integumentary: No rashes, No breakdown


Neurological: Normal speech, Normal strength at 5/5 x4 extr, Normal tone, Normal

 affect





Laboratory Data


- Reviewed





Microbiology Data


- Reviewed





Imagings Data: 


- Reviewed








Conclusions/Impression: 





Problem List


Sepsis


Cavitary Lesion vs Necrotizing Multifocal Pneumonia


Right Hilar Lymphadenopathy 


Hypertension


Hyperglycemia/ Diabetes 








Cavitary Lesion vs Necrotizing Pneumonia


- CTA Chest 8/19: "No evidence of acute central pulmonary emboli.   Multifocal 

right lower lobe airspace opacities with a dominant masslike lesion 

demonstrating central cavitation, favored to represent cavitary or necrotizing 

pneumonia. Short-term interval follow-up CT in 1-3 months or following resolu

tion of any acute symptoms is recommended to exclude an underlying mass. Small 

irregular peripheral right upper lobe 1 cm nodules with central cavitation, 

should also be evaluated on short-term follow-up CT."


- Blood cultures 8/19: No growth to date


- Sputum culture 8/20: 3+ gram positive cocci and 1+ yeast


- Leukocytosis improving. Afebrile >48 hours. 


- On Zosyn and Vancomycin (started 8/20)





Travel: Recently moved back to [place] from [place]. 





Recommendations


- Continue Vancomycin for now. Recommend starting on Cefepime and discontinuing 

Zosyn. 


- Follow up with final sputum culture results 


- Rule out TB: follow up AFB culture/smear and quantiFERON results. 


- Pulmonology on case. Pending bronchoscopy, likely tomorrow. 


- Monitor WBC and fever trends











Case discussed with CAROLYN Liao

## 2023-08-22 VITALS — SYSTOLIC BLOOD PRESSURE: 140 MMHG | TEMPERATURE: 97.4 F | DIASTOLIC BLOOD PRESSURE: 82 MMHG

## 2023-08-22 VITALS — OXYGEN SATURATION: 96 %

## 2023-08-22 LAB
BUN BLD-MCNC: 3 MG/DL (ref 7–18)
GLUCOSE SERPLBLD-MCNC: 145 MG/DL (ref 74–106)
HCT VFR BLD CALC: 39.3 % (ref 39.6–49)
LYMPHOCYTES # SPEC AUTO: 1.6 K/UL (ref 0.7–4.9)
MCV RBC: 98.9 FL (ref 80–100)
PMV BLD: 7.4 FL (ref 7.6–11.3)
POTASSIUM SERPL-SCNC: 3.1 MEQ/L (ref 3.5–5.1)
RBC # BLD: 3.98 M/UL (ref 4.33–5.43)
WBC # BLD AUTO: 10.1 THOU/UL (ref 4.3–10.9)

## 2023-08-22 PROCEDURE — 0B9F8ZX DRAINAGE OF RIGHT LOWER LUNG LOBE, VIA NATURAL OR ARTIFICIAL OPENING ENDOSCOPIC, DIAGNOSTIC: ICD-10-PCS

## 2023-08-22 RX ADMIN — DEXTROMETHORPHAN HYDROBROMIDE AND PROMETHAZINE HYDROCHLORIDE SCH: 15; 6.25 SOLUTION ORAL at 07:44

## 2023-08-22 RX ADMIN — SODIUM CHLORIDE SCH: 9 INJECTION, SOLUTION INTRAVENOUS at 13:00

## 2023-08-22 RX ADMIN — POTASSIUM CHLORIDE SCH MLS: 200 INJECTION, SOLUTION INTRAVENOUS at 06:00

## 2023-08-22 RX ADMIN — DEXTROMETHORPHAN HYDROBROMIDE AND PROMETHAZINE HYDROCHLORIDE SCH ML: 15; 6.25 SOLUTION ORAL at 03:45

## 2023-08-22 RX ADMIN — SODIUM CHLORIDE SCH MLS: 0.9 INJECTION, SOLUTION INTRAVENOUS at 10:33

## 2023-08-22 RX ADMIN — BENZONATATE PRN MG: 100 CAPSULE ORAL at 10:31

## 2023-08-22 RX ADMIN — NICOTINE SCH MG: 21 PATCH TRANSDERMAL at 10:31

## 2023-08-22 RX ADMIN — SODIUM CHLORIDE SCH MLS: 9 INJECTION, SOLUTION INTRAVENOUS at 04:45

## 2023-08-22 RX ADMIN — Medication SCH ML: at 07:44

## 2023-08-22 RX ADMIN — POTASSIUM CHLORIDE SCH MLS: 200 INJECTION, SOLUTION INTRAVENOUS at 11:28

## 2023-08-22 RX ADMIN — METFORMIN HYDROCHLORIDE SCH: 500 TABLET, FILM COATED ORAL at 07:43

## 2023-08-22 NOTE — P.PN
Date of Service: 08/22/23


Chief Complaint: Sepsis, pneumonia, hemoptysis


Subjective: Improving. 


Patient denies any new or worsening complaints. 


No acute events reported overnight. 


s/p bronchoscopy this morning by Dr. Silver. 








Physical Examination











Temp Pulse Resp BP Pulse Ox


 


 97 F   85   23 H  115/75   96 


 


 08/22/23 08:54  08/22/23 09:04  08/22/23 09:04  08/22/23 09:04  08/22/23 04:00








General: Alert, In no apparent distress, Oriented x3


HEENT: Atraumatic, Normocephalic


Neck: Supple, JVD not distended


Respiratory: Normal air movement. On room air. Mild crackles/rales (Right)


Cardiovascular: No edema. Normal pulses. Regular rate/rhythm


Gastrointestinal: Normal bowel sounds, Soft and benign. No tenderness. 


Musculoskeletal: No clubbing, No swelling


Integumentary: No rashes, No breakdown


Neurological: Normal speech. Normal strength at 5/5 x4 extr. Normal tone, Normal

affect





Laboratory Data


- Reviewed





Microbiology Data


- Reviewed





Imagings Data: 


- Reviewed





Medications List: Reviewed








Assessment and Plan





Problem List


Sepsis


Cavitary Lesion vs Multifocal Pneumonia


Right Hilar Lymphadenopathy 


Hypertension


Hyperglycemia/ Diabetes 








Cavitary Lesion 


- CTA Chest 8/19: "No evidence of acute central pulmonary emboli.   Multifocal 

right lower lobe airspace opacities with a dominant masslike lesion 

demonstrating central cavitation, favored to represent cavitary or necrotizing 

pneumonia. Short-term interval follow-up CT in 1-3 months or following resolut

ion of any acute symptoms is recommended to exclude an underlying mass. Small 

irregular peripheral right upper lobe 1 cm nodules with central cavitation, 

should also be evaluated on short-term follow-up CT."


- Blood cultures 8/19: No growth to date


- Sputum culture 8/20: 3+ gram positive cocci and 1+ yeast


- AFB pending


- Leukocytosis improving. Afebrile >48 hours. 


- On Zosyn and Vancomycin (started 8/20)


- Bronchoscopy 8/22 by Dr. Silver, no evidence of purulence or bleeding 

reported.








Recommendations


- Continue current antibiotics for now.


  - Follow up with final sputum culture results 


  - follow up AFB culture/smear and quantiFERON results.


- Monitor WBC and fever trends


See pulmonology and hospitalist notes for further recommendations.











Case discussed with CAROLYN Liao

## 2023-08-22 NOTE — P.OP
Date of Service: 08/22/23 (Bronchoscopy with BAL)








Findings and Operative Technique





Patient is 42 years of age admitted with right lower lobe superior segmental 

cavitary pneumonia he had significant hemoptysis as a reason for bronchoscopy 

after obtaining informed consent from the patient he was premedicated by 

anesthesia


Very difficult time intubating the scope through his nasal passages very 

constricted


Finally we had to insert bite block and do bronchoscopy orally 2 cords normal 

normal trachea normal pedro pablo no endobronchial lesions visible through the right 

of the left respiratory tract back there was no evidence of any bleeding or 

purulence in his entire respiratory tract which is surprising


Managed to quickly obtain a BAL from the right lower lobe as he kept on 

desatting


patient states that he is not coughing up any further blood chest x-ray looks a 

little better his white count is down significantly


AFP is negative plan to discharge him on Augmentin and doxycycline

## 2023-08-22 NOTE — RAD REPORT
EXAM DESCRIPTION:  RAD - Fluoroscopy <1 Hour - 8/22/2023 12:20 pm

 

CLINICAL HISTORY:  BRONCH

 

COMPARISON:  No comparisons

 

FINDINGS/IMPRESSION:  Three fluoroscopic images were submitted showing a bronchoscopy via the right m
ainstem bronchus.

 

Fluoro time: 25 seconds

Cumulative Dose: 3.79 mGy

## 2023-08-22 NOTE — RAD REPORT
EXAM DESCRIPTION:  RAD - Chest Single View - 8/22/2023 4:40 am

 

CLINICAL HISTORY:  pneumonia

 

COMPARISON:  Chest Single View dated 8/21/2023; Chest Single View dated 8/19/2023; Chest Single View 
dated 3/19/2020; Chest Pa And Lat (2 Views) dated 2/27/2020

 

FINDINGS:  Lines: None.

Lungs: Right perihilar masslike consolidation is grossly similar to 08/21/2023 .

Pleural: No significant pleural effusions or pneumothorax.

Cardiac: The heart size is within normal limits.

Mediastinum: Within normal limits.

Bones: No acute fractures.

Other: None

 

IMPRESSION:  Right perihilar consolidation is similar to 08/21/2023.

## 2024-10-06 ENCOUNTER — HOSPITAL ENCOUNTER (EMERGENCY)
Dept: HOSPITAL 97 - ER | Age: 44
Discharge: ANOTHER HOSPITAL | End: 2024-10-06
Payer: COMMERCIAL

## 2024-10-06 VITALS — TEMPERATURE: 97.9 F

## 2024-10-06 VITALS — DIASTOLIC BLOOD PRESSURE: 91 MMHG | SYSTOLIC BLOOD PRESSURE: 138 MMHG | OXYGEN SATURATION: 96 %

## 2024-10-06 DIAGNOSIS — K83.8: ICD-10-CM

## 2024-10-06 DIAGNOSIS — K85.90: Primary | ICD-10-CM

## 2024-10-06 DIAGNOSIS — F17.210: ICD-10-CM

## 2024-10-06 LAB
ALBUMIN SERPL BCP-MCNC: 3.2 G/DL (ref 3.4–5)
ALBUMIN/GLOB SERPL: 0.7 {RATIO} (ref 1.1–1.8)
ALP SERPL-CCNC: 148 U/L (ref 45–117)
ALT SERPL W P-5'-P-CCNC: 41 U/L (ref 16–61)
ANION GAP SERPL CALC-SCNC: 9 MEQ/L (ref 5–15)
AST SERPL W P-5'-P-CCNC: 85 U/L (ref 15–37)
BLD SMEAR INTERP: (no result)
BUN BLD-MCNC: 10 MG/DL (ref 7–18)
GLOBULIN SER CALC-MCNC: 4.5 G/DL (ref 2.3–3.5)
GLUCOSE SERPLBLD-MCNC: 316 MG/DL (ref 74–106)
HCT VFR BLD CALC: 47.5 % (ref 39.6–49)
HGB BLD-MCNC: 15.5 G/DL (ref 13.6–17.9)
LYMPHOCYTES # SPEC AUTO: 1.4 K/UL (ref 0.7–4.9)
MACROCYTES BLD QL: (no result)
MCH RBC QN AUTO: 39 PG (ref 27–35)
MCHC RBC AUTO-ENTMCNC: 32.7 G/DL (ref 32–36)
MCV RBC: 119.3 FL (ref 80–100)
MORPHOLOGY BLD-IMP: (no result)
NRBC # BLD: 0 10*3/UL (ref 0–0)
NRBC BLD AUTO-RTO: 0 % (ref 0–0)
PMV BLD: 7.2 FL (ref 7.6–11.3)
POTASSIUM SERPL-SCNC: 3 MEQ/L (ref 3.5–5.1)
RBC # BLD: 3.98 M/UL (ref 4.33–5.43)
WBC # BLD AUTO: 11.8 THOU/UL (ref 4.3–10.9)

## 2024-10-06 PROCEDURE — 93005 ELECTROCARDIOGRAM TRACING: CPT

## 2024-10-06 PROCEDURE — 71045 X-RAY EXAM CHEST 1 VIEW: CPT

## 2024-10-06 PROCEDURE — 83690 ASSAY OF LIPASE: CPT

## 2024-10-06 PROCEDURE — 99285 EMERGENCY DEPT VISIT HI MDM: CPT

## 2024-10-06 PROCEDURE — 74177 CT ABD & PELVIS W/CONTRAST: CPT

## 2024-10-06 PROCEDURE — 36415 COLL VENOUS BLD VENIPUNCTURE: CPT

## 2024-10-06 PROCEDURE — 84484 ASSAY OF TROPONIN QUANT: CPT

## 2024-10-06 PROCEDURE — 85025 COMPLETE CBC W/AUTO DIFF WBC: CPT

## 2024-10-06 PROCEDURE — 80053 COMPREHEN METABOLIC PANEL: CPT

## 2024-10-06 NOTE — EDPHYS
Physician Documentation                                                                           

 [facility]                                                                 

Name: Gil Carrasco                                                                                  

Age: 43 yrs                                                                                       

Sex: Male                                                                                         

: 1980                                                                                   

MRN: [id number]                                                                                   

Arrival Date: 10/06/2024                                                                          

Time: 06:46                                                                                       

Account#: [id number]                                                                            

Bed 5                                                                                             

Private MD:                                                                                       

ED Physician Manas Hickman                                                                         

HPI:                                                                                              

10/06                                                                                             

07:21 This 43 yrs old Male presents to ER via Ambulatory with complaints of Abdominal Pain.   sp3 

07:21 43-year-old male with history of hypertension, diabetes presents to the ED with chief   sp3 

      complaint epigastric pain radiating inferiorly towards his lower quadrants. Pain            

      started yesterday evening and today is progressively get worse. He endorses mild emesis     

      over the last few days in the morning without blood or coffee grounds. He denies fever,     

      chest pain, shortness of breath, back pain, diarrhea, dysuria, travel history,              

      potential bad food, known sick contacts, or any other signs or symptoms on ROS at this      

      time..                                                                                      

                                                                                                  

Historical:                                                                                       

- Allergies:                                                                                      

07:03 No Known Allergies;                                                                     al5 

- PMHx:                                                                                           

07:03 diabetes mellitus; Hypertensive disorder;                                               al5 

- PSHx:                                                                                           

07:03 None;                                                                                   al5 

                                                                                                  

- Immunization history:: Adult Immunizations up to date.                                          

- Infectious Disease History:: Denies.                                                            

- Social history:: Smoking status: Patient reports the use of cigarette tobacco                   

  products, smokes 1.5 packs per day, Patient uses alcohol, every other day has 3-4               

  drinks.                                                                                         

                                                                                                  

                                                                                                  

ROS:                                                                                              

07:22 Constitutional: Negative for fever, chills, and weight loss, Eyes: Negative for injury, sp3 

      pain, redness, and discharge, ENT: Negative for injury, pain, and discharge, Neck:          

      Negative for injury, pain, and swelling, Cardiovascular: Negative for chest pain,           

      palpitations, and edema, Respiratory: Negative for shortness of breath, cough,              

      wheezing, and pleuritic chest pain, Back: Negative for injury and pain, MS/Extremity:       

      Negative for injury and deformity, Skin: Negative for injury, rash, and discoloration,      

      Neuro: Negative for headache, weakness, numbness, tingling, and seizure, Psych:             

      Negative for depression, anxiety, suicide ideation, homicidal ideation, and                 

      hallucinations, Allergy/Immunology: Negative for hives, rash, and allergies, Endocrine:     

      Negative for neck swelling, polydipsia, polyuria, polyphagia, and marked weight             

      changes,                                                                                    

07:22 All other systems are negative,                                                             

                                                                                                  

Exam:                                                                                             

07:22 Constitutional:  This is a well developed, well nourished patient who is awake, alert,  sp3 

      and in no acute distress. Head/Face:  Normocephalic, atraumatic. Eyes:  Pupils equal        

      round and reactive to light, extra-ocular motions intact.  Lids and lashes normal.          

      Conjunctiva and sclera are non-icteric and not injected.  Cornea within normal limits.      

      Periorbital areas with no swelling, redness, or edema. Neck:  Trachea midline, no           

      thyromegaly or masses palpated, and no cervical lymphadenopathy.  Supple, full range of     

      motion without nuchal rigidity, or vertebral point tenderness.  No Meningismus.             

      Chest/axilla:  Normal chest wall appearance and motion.  Nontender with no deformity.       

      No lesions are appreciated. Cardiovascular:  Regular rate and rhythm with a normal S1       

      and S2.  No gallops, murmurs, or rubs.  Normal PMI, no JVD.  No pulse deficits.             

      Respiratory:  Lungs have equal breath sounds bilaterally, clear to auscultation and         

      percussion.  No rales, rhonchi or wheezes noted.  No increased work of breathing, no        

      retractions or nasal flaring. Back:  No spinal tenderness.  No costovertebral               

      tenderness.  Full range of motion. Skin:  Warm, dry with normal turgor.  Normal color       

      with no rashes, no lesions, and no evidence of cellulitis. MS/ Extremity:  Pulses           

      equal, no cyanosis.  Neurovascular intact.  Full, normal range of motion. Neuro:  Awake     

      and alert, GCS 15, oriented to person, place, time, and situation.  Cranial nerves          

      II-XII grossly intact.  Motor strength 5/5 in all extremities.  Sensory grossly intact.     

       Cerebellar exam normal.  Normal gait. Psych:  Awake, alert, with orientation to            

      person, place and time.  Behavior, mood, and affect are within normal limits.               

07:22 Abdomen/GI: Epigastric pain to palpation without peritoneal signs, rebound or guarding.     

       Mild tachycardia noted as well as hypertension.,                                           

                                                                                                  

Vital Signs:                                                                                      

06:57  / 110; Pulse 108; Resp 18; Temp 97.9; Pulse Ox 93% on R/A; Weight 95.25 kg;      al5 

      Height 5 ft. 9 in. ; Pain 10/10;                                                            

08:39  / 106; Pulse 95; Resp 16; Pulse Ox 95% ;                                         bp  

09:33  / 91; Pulse 87; Resp 16; Pulse Ox 96% ;                                          bp  

06:57 Body Mass Index 31.01 (95.25 kg, 175.26 cm)                                             al5 

06:57 Pain Scale: Adult                                                                       al5 

                                                                                                  

MDM:                                                                                              

07:03 Patient medically screened.                                                             sp3 

07:23 Data reviewed: vital signs, nurses notes, lab test result(s), EKG, radiologic studies.  sp3 

      ED course: 43-year-old male with PMH above now with epigastric pain. Differential           

      diagnosis includes biliary pathology including cholelithiasis, pancreatitis, gastritis,     

      colitis, UTI/pyelonephritis spectrum, kidney stone, functional abdominal pain, among        

      others. Workup will include laboratory values and a CT scan of the abdomen pelvis with      

      IV contrast. General supportive care with IV fluids and pain and nausea medication as       

      needed. Disposition pending workup and patient course..                                     

08:54 ED course: Patient with acute pancreatitis coupled with common bile duct dilatation to  sp3 

      14 mm indicating possible mass versus stone. Will continue with pain medication and         

      antibiotics x 1. Patient being transferred secondary to no GI coverage here and patient     

      needing ERCP..                                                                              

                                                                                                  

10                                                                                             

06:49 Order name: CBC with Diff; Complete Time: 08:47                                         ec2 

10/06                                                                                             

06:49 Order name: CMP; Complete Time: 08:03                                                   ec2 

10/06                                                                                             

06:49 Order name: Lipase; Complete Time: 08:03                                                ec2 

10/06                                                                                             

06:57 Order name: Troponin High Sensitivity; Complete Time: 08:03                             ec2 

10/06                                                                                             

07:49 Order name: CBC Smear Scan; Complete Time: 08:47                                        EDMS

10/06                                                                                             

06:57 Order name: CXR XRAY; Complete Time: 08:03                                              ec2 

10/06                                                                                             

06:57 Order name: CT Abd/Pelvis - IV Contrast Only; Complete Time: 08:47                      ec2 

10/06                                                                                             

06:49 Order name: IV Saline Lock; Complete Time: 07:22                                        ec2 

10/06                                                                                             

06:49 Order name: Labs collected and sent; Complete Time: 07:22                               ec2 

10/06                                                                                             

07:00 Order name: EKG - Nurse/Tech; Complete Time: 07:06                                      ec2 

                                                                                                  

Administered Medications:                                                                         

07:22 Drug: HYDROmorphone IVP 1 mg IVP once Route: IVP; Site: right antecubital;              rs5 

09:01 Follow up: Response: No adverse reaction                                                bp  

07:22 Drug: Ondansetron IVP 4 mg IVP once; over 2 minutes Route: IVP; Site: right antecubital;rs5 

09:01 Follow up: Response: No adverse reaction                                                bp  

07:39 Drug: NS 0.9% IV 1000 ml IV at 1 bolus Per protocol; 1000 mL bolus Route: IV; Rate: 1   rs5 

      bolus; Site: right antecubital;                                                             

09:01 Follow up: IV Status: Completed infusion; IV Intake: 1000ml                             bp  

08:39 Drug: HYDROmorphone IVP 1 mg IVP once Route: IVP; Site: right forearm;                  bp  

09:01 Follow up: Response: No adverse reaction                                                bp  

09:01 Drug: Piperacillin-Tazobactam IVPB 3.375 grams IVPB once over 60 mins; (mix in    bp  

      mL) Route: IVPB; Infused Over: 60 mins; Site: right forearm;                                

09:37 Follow up: IV Status: Completed infusion; IV Intake: 100ml                              bp  

09:45 Drug: Potassium Chloride IV 20 mEq IV at calculated rate once; administer over 1-2      rs5 

      hours Route: IV; Rate: calculated rate; Site: right antecubital;                            

10:08 Follow up: IV Status: Infusion continued upon transfer                                  bp  

09:57 Drug: HYDROmorphone IVP 1 mg IVP once Route: IVP; Site: right antecubital;              rs5 

10:08 Follow up: Response: No adverse reaction                                                bp  

                                                                                                  

                                                                                                  

Disposition Summary:                                                                              

10/06/24 08:54                                                                                    

Transfer Ordered                                                                                  

 Notes:       Transfer Location: [facility]                              
  sp3

      Reason: Higher level of care                                                            sp3 

      Condition: Stable                                                                       sp3 

      Problem: new                                                                            sp3 

      Symptoms: have worsened                                                                 sp3 

      Accepting Physician: [facility](10/06/24 10:11)                                 bp  

      Diagnosis                                                                                   

        - Acute pancreatitis, common bile duct dilatation, abdominal pain                     sp3 

      Forms:                                                                                      

        - Medication Reconciliation Form                                                      sp3 

        - SBAR form                                                                           sp3 

Signatures:                                                                                       

Dispatcher [employer]                           Arturo Harry RN                      RN   bp                                                   

Manas Hickman MD MD   sp3                                                  

Curtis aZvala RN                       RN   rs5                                                  

Mj Albarado MD MD ec2                                                  

Betsy Ibarra RN                   RN   al5                                                  

                                                                                                  

Corrections: (The following items were deleted from the chart)                                    

06:58 06:58 Abdomen Pelvis W Con+CT.RAD.BRZ ordered. EDMS                                     EDMS

08:55 08:54 ED course: Patient with acute pancreatitis coupled with common bile duct          sp3 

      dilatation to 14 mm indicating possible mass versus stone. Will continue with pain          

      medication and antibiotics x 1.. sp3                                                        

10:11 08:54 TBD [facility] sp3                                                            bp  

                                                                                                  

**************************************************************************************************

## 2024-10-06 NOTE — RAD REPORT
EXAMINATION: CT Abdomen   Pelvis W Contrast



CLINICAL INDICATION: Male, 43 years old.  ABD PAIN



TECHNIQUE: CT abdomen and pelvis was performed, after the administration of IV contrast, as per depar
[facility] protocol. Axial, sagittal and coronal reconstructions were obtained. One or more of the

following dose reduction techniques were used: Automated exposure control, adjustment of the mA and k
V according to patient size, and iterative reconstruction. Unless otherwise specified, incidental

findings do not require dedicated imaging follow-up. 

 



COMPARISON: CT chest 10/3/2023. CT abdomen and pelvis 7/9/2009





FINDINGS: 



LOWER CHEST: The visualized lung bases are clear.



LIVER: Normal in size and contour. Diffuse parenchymal hypoattenuation suggesting steatosis. No focal
 lesion.



BILIARY SYSTEM: Prominent caliber of the common bile duct, measuring 14 mm. No radiopaque calculi.



SPLEEN: Normal size.  No focal lesion.



PANCREAS: Diffuse parenchymal swelling with adjacent pronounced fat stranding. There is adjacent muco
sal hyperenhancement of the gastric antrum and duodenum. Mildly prominent adjacent lymph nodes,

likely reactive.



ADRENALS: Left adrenal 1.6 cm nodule with mild central hypoattenuation, was perhaps partially visuali
zed on the prior chest CT, but appears new the 2009 CT abdomen.



KIDNEYS: Normal size and contour. No hydronephrosis.



URINARY BLADDER: Decompressed limiting evaluation..



GASTROINTESTINAL TRACT: No evidence of free air, significant intra-abdominal free fluid, bowel obstru
ction or abscess. 



APPENDIX: Normal appendix. 



LYMPH NODES: No lymphadenopathy.



MUSCULOSKELETAL: No acute or suspicious osseous abnormality.



ADDITIONAL FINDINGS: None.





IMPRESSION: 



Findings of acute interstitial pancreatitis. No discrete fluid collections or region of hypoenhanceme
nt to suggest necrosis.



Prominent caliber of the common bile duct to the level of the ampulla, up to 14 mm of indeterminate e
tiology. A distal obstructing mass or calculus that are not evident on CT are not excluded.



Left adrenal 1.6 cm indeterminate nodule, appears new since 2009, may represent a slow-growing adenom
a, although neoplastic etiologies should also be considered.



Diffuse hepatic steatosis.







Reported By: Demario Granados 

Electronically Signed:  10/6/2024 8:37 AM

## 2024-10-06 NOTE — ER
Nurse's Notes                                                                                     

 [facility]                                                                 

Name: Gil Carrasco                                                                                  

Age: 43 yrs                                                                                       

Sex: Male                                                                                         

: 1980                                                                                   

MRN: [id number]                                                                                   

Arrival Date: 10/06/2024                                                                          

Time: 06:46                                                                                       

Account#: [id number]                                                                            

Bed 5                                                                                             

Private MD:                                                                                       

Diagnosis: Acute pancreatitis, common bile duct dilatation, abdominal pain                        

                                                                                                  

Presentation:                                                                                     

10/06                                                                                             

06:57 Chief complaint: Patient states: c/o epigastric/chest pain that radiates throughout the al5 

      whole abdomen and to his sides starting this morning. patient states he has been having     

      nausea and vomiting for years. Coronavirus screen: At this time, the client does not        

      indicate any symptoms associated with coronavirus-19. Ebola Screen: No symptoms or          

      risks identified at this time. Initial Sepsis Screen: Does the patient meet any 2           

      criteria? HR > 90 bpm. No. Patient's initial sepsis screen is negative. Does the            

      patient have a suspected source of infection? No. Patient's initial sepsis screen is        

      negative. Risk Assessment: Do you want to hurt yourself or someone else? Patient            

      reports no desire to harm self or others. Onset of symptoms was 2024.           

06:57 Method Of Arrival: Ambulatory                                                           al5 

06:57 Acuity: ARTUR 3                                                                           al5 

                                                                                                  

Triage Assessment:                                                                                

07:04 General: Appears in no apparent distress. uncomfortable, Behavior is calm, cooperative. al5 

      Pain: Complains of pain in epigastric area Pain radiates to posterior aspect of left        

      lateral abdomen, posterior aspect of right lateral abdomen and abdomen diffusely Pain       

      currently is 10 out of 10 on a pain scale. EENT: No signs and/or symptoms were reported     

      regarding the EENT system. Neuro: Level of Consciousness is awake, alert, obeys             

      commands, Oriented to person, place, time, situation. Cardiovascular: Capillary refill      

      < 3 seconds skin cold and clammy. Rhythm is sinus tachycardia. Respiratory: Airway is       

      patent Respiratory effort is even, unlabored, Respiratory pattern is regular,               

      symmetrical. GI: Abdomen is round non-distended, Reports lower abdominal pain, upper        

      abdominal pain, nausea, vomiting. : No signs and/or symptoms were reported regarding      

      the genitourinary system. Derm: Skin is intact, Skin is pink, warm \T\ dry. normal.         

      Musculoskeletal: No signs and/or symptoms reported regarding the musculoskeletal system.    

                                                                                                  

Historical:                                                                                       

- Allergies:                                                                                      

07:03 No Known Allergies;                                                                     al5 

- PMHx:                                                                                           

07:03 diabetes mellitus; Hypertensive disorder;                                               al5 

- PSHx:                                                                                           

07:03 None;                                                                                   al5 

                                                                                                  

- Immunization history:: Adult Immunizations up to date.                                          

- Infectious Disease History:: Denies.                                                            

- Social history:: Smoking status: Patient reports the use of cigarette tobacco                   

  products, smokes 1.5 packs per day, Patient uses alcohol, every other day has 3-4               

  drinks.                                                                                         

                                                                                                  

                                                                                                  

Screenin:05 [facility] ED Fall Risk Assessment (Adult) History of falling in the last 3 months,       al5 

      including since admission No falls in past 3 months (0 pts) Confusion or Disorientation     

      No (0 pts) Intoxicated or Sedated No (0 pts) Impaired Gait No (0 pts) Mobility Assist       

      Device Used No (0 pt) Altered Elimination No (0 pt) Score/Fall Risk Level 0 - 2 = Low       

      Risk Oriented to surroundings, Maintained a safe environment, Hourly rounding (assess       

      needs \T\ fall precautionary measures) done. Abuse screen: Denies threats or abuse.         

      Denies injuries from another. Nutritional screening: No deficits noted. Tuberculosis        

      screening: No symptoms or risk factors identified.                                          

                                                                                                  

Assessment:                                                                                       

07:05 Reassessment: see triage assessment.                                                    al5 

07:06 GI: Bowel sounds present X 4 quads. Abd is soft X 4 quads Abdomen is tender to          al5 

      palpation.                                                                                  

08:41 Reassessment: No changes from previously documented assessment. Patient is alert,       bp  

      oriented x 3, equal unlabored respirations, skin warm/dry/pink.                             

09:34 Reassessment: REPORT TO [facility] 1815.                                             bp  

10:09 Reassessment:  EMS AT B/S FOR TRANSPORT.                                              bp  

                                                                                                  

Vital Signs:                                                                                      

06:57  / 110; Pulse 108; Resp 18; Temp 97.9; Pulse Ox 93% on R/A; Weight 95.25 kg;      al5 

      Height 5 ft. 9 in. ; Pain 10/10;                                                            

08:39  / 106; Pulse 95; Resp 16; Pulse Ox 95% ;                                         bp  

09:33  / 91; Pulse 87; Resp 16; Pulse Ox 96% ;                                          bp  

06:57 Body Mass Index 31.01 (95.25 kg, 175.26 cm)                                             al5 

06:57 Pain Scale: Adult                                                                       al5 

                                                                                                  

ED Course:                                                                                        

06:47 Patient arrived in ED.                                                                  jj6 

06:57 Langhorst, Betsy, RN is Primary Nurse.                                                 al5 

06:59 Triage completed.                                                                       al5 

07:01 EKG done, by ED staff.                                                                  dd2 

07:03 Manas Hickman MD is Attending Physician.                                                sp3 

07:05 Arm band placed on right wrist. Patient placed in the treatment room, on a stretcher.   al5 

      EKG completed in triage. Results shown to MD.                                               

07:06 No provider procedures requiring assistance completed.                                  al5 

07:06 Patient has correct armband on for positive identification. Call light in reach. Side   al5 

      rails up X2. Provided Education on: plan of care.                                           

07:18 Inserted saline lock: 20 gauge in right antecubital area, using aseptic technique.      rs5 

      Blood collected. Flushed with 10 mL NS.                                                     

07:50 CXR XRAY In Process Unspecified.                                                        EDMS

08:03 CT Abd/Pelvis - IV Contrast Only In Process Unspecified.                                EDMS

08:55 initiated a transfer with Trip from the [facility] Transfer [place].                    eb  

09:19 connected the hospitalist on call for [facility] with Dr. Hickman for patient         eb  

      transfer consultation.                                                                      

09:24 administrative approval given by Trip Vilchis Rn/ patient has been accepted to [facility] room 1815/ Dr. Larry Schwartz has accepted the patient in transfer/ report to be     

      called to [phone].                                                                     

09:37 Patient transferred, IV remains in place.                                               bp  

                                                                                                  

Administered Medications:                                                                         

07:22 Drug: HYDROmorphone IVP 1 mg IVP once Route: IVP; Site: right antecubital;              rs5 

09:01 Follow up: Response: No adverse reaction                                                bp  

07:22 Drug: Ondansetron IVP 4 mg IVP once; over 2 minutes Route: IVP; Site: right antecubital;rs5 

09:01 Follow up: Response: No adverse reaction                                                bp  

07:39 Drug: NS 0.9% IV 1000 ml IV at 1 bolus Per protocol; 1000 mL bolus Route: IV; Rate: 1   rs5 

      bolus; Site: right antecubital;                                                             

09:01 Follow up: IV Status: Completed infusion; IV Intake: 1000ml                             bp  

08:39 Drug: HYDROmorphone IVP 1 mg IVP once Route: IVP; Site: right forearm;                  bp  

09:01 Follow up: Response: No adverse reaction                                                bp  

09:01 Drug: Piperacillin-Tazobactam IVPB 3.375 grams IVPB once over 60 mins; (mix in    bp  

      mL) Route: IVPB; Infused Over: 60 mins; Site: right forearm;                                

09:37 Follow up: IV Status: Completed infusion; IV Intake: 100ml                              bp  

09:45 Drug: Potassium Chloride IV 20 mEq IV at calculated rate once; administer over 1-2      rs5 

      hours Route: IV; Rate: calculated rate; Site: right antecubital;                            

10:08 Follow up: IV Status: Infusion continued upon transfer                                  bp  

09:57 Drug: HYDROmorphone IVP 1 mg IVP once Route: IVP; Site: right antecubital;              rs5 

10:08 Follow up: Response: No adverse reaction                                                bp  

                                                                                                  

                                                                                                  

Medication:                                                                                       

07:05 VIS not applicable for this client.                                                     al5 

                                                                                                  

Intake:                                                                                           

09:01 IV: 1000ml; Total: 1000ml.                                                              bp  

09:37 IV: 100ml; Total: 1100ml.                                                               bp  

                                                                                                  

Outcome:                                                                                          

08:54 ER care complete, transfer ordered by MD. [email or web address] 

09:36 Transferred by ground EMS to [facility], Transfer form completed.    bp  

09:36 Condition: stable                                                                           

09:36 Instructed on the need for transfer,                                                        

10:11 Patient left the ED.                                                                    bp  

                                                                                                  

Signatures:                                                                                       

Dispatcher [employer]                           EDArturo Howard RN                      Riya Villatoro Setul, MD MD   sp3                                                  

Natalie Nieves6                                                  

Curtis Zavala RN                       RN   rs5                                                  

Betsy Ibarra RN RN   al5                                                  

GERBER LANGLEY RN                        RN   dd2                                                  

                                                                                                  

**************************************************************************************************

## 2024-10-06 NOTE — RAD REPORT
EXAMINATION: ONE VIEW CHEST XR



CLINICAL INDICATION: Male, 43 years old.,CHEST PAIN



TECHNIQUE: Frontal chest projection is submitted. Examination is limited by patient positioning and t
echnique.



COMPARISON: 10/3/2020 chest x-ray



FINDINGS:

The lungs are well inflated and clear.  No pneumothorax or sizable effusion. The heart is normal in s
ize.



IMPRESSION: 



No acute intrathoracic abnormalities. 







Reported By: Demario Granados 

Electronically Signed:  10/6/2024 7:50 AM

## 2024-10-07 NOTE — EKG
Test Date:    2024-10-06               Test Time:    06:58:41

Technician:   KINGS                                     

                                                     

MEASUREMENT RESULTS:                                       

Intervals:                                           

Rate:         102                                    

KY:           142                                    

QRSD:         86                                     

QT:           392                                    

QTc:          510                                    

Axis:                                                

P:            61                                     

KY:           142                                    

QRS:          42                                     

T:            52                                     

                                                     

INTERPRETIVE STATEMENTS:                                       

                                                     

Sinus tachycardia

Right atrial enlargement

Borderline ECG

Compared to ECG 10/03/2023 03:03:34

Atrial abnormality now present

Sinus rhythm no longer present



Electronically Signed On 10-07-24 11:52:17 CDT by Derek Platt

## 2025-04-03 ENCOUNTER — HOSPITAL ENCOUNTER (EMERGENCY)
Dept: HOSPITAL 97 - ER | Age: 45
Discharge: HOME | End: 2025-04-03
Payer: SELF-PAY

## 2025-04-03 VITALS — DIASTOLIC BLOOD PRESSURE: 109 MMHG | SYSTOLIC BLOOD PRESSURE: 170 MMHG | OXYGEN SATURATION: 98 % | TEMPERATURE: 98 F

## 2025-04-03 DIAGNOSIS — F17.210: ICD-10-CM

## 2025-04-03 DIAGNOSIS — K12.2: Primary | ICD-10-CM

## 2025-04-03 PROCEDURE — 99283 EMERGENCY DEPT VISIT LOW MDM: CPT

## 2025-04-04 ENCOUNTER — HOSPITAL ENCOUNTER (EMERGENCY)
Dept: HOSPITAL 97 - ER | Age: 45
Discharge: HOME | End: 2025-04-04
Payer: SELF-PAY

## 2025-04-04 ENCOUNTER — HOSPITAL ENCOUNTER (EMERGENCY)
Dept: HOSPITAL 97 - ER | Age: 45
LOS: 1 days | Discharge: HOME | End: 2025-04-05
Payer: SELF-PAY

## 2025-04-04 VITALS — OXYGEN SATURATION: 95 % | SYSTOLIC BLOOD PRESSURE: 167 MMHG | DIASTOLIC BLOOD PRESSURE: 91 MMHG

## 2025-04-04 VITALS — TEMPERATURE: 98.6 F

## 2025-04-04 DIAGNOSIS — K12.2: Primary | ICD-10-CM

## 2025-04-04 DIAGNOSIS — T50.995A: ICD-10-CM

## 2025-04-04 DIAGNOSIS — E13.65: Primary | ICD-10-CM

## 2025-04-04 DIAGNOSIS — E87.6: ICD-10-CM

## 2025-04-04 LAB
ALBUMIN SERPL BCP-MCNC: 3.1 G/DL (ref 3.4–5)
ALBUMIN SERPL BCP-MCNC: 3.6 G/DL (ref 3.4–5)
ALBUMIN/GLOB SERPL: 0.7 {RATIO} (ref 1.1–1.8)
ALBUMIN/GLOB SERPL: 0.8 {RATIO} (ref 1.1–1.8)
ANION GAP SERPL CALC-SCNC: 13.1 MEQ/L (ref 5–15)
BILIRUB INDIRECT SERPL-MCNC: 0.3 MG/DL (ref 0.2–0.8)
BLD SMEAR INTERP: (no result)
BLD SMEAR INTERP: (no result)
GLOBULIN SER CALC-MCNC: 4.2 G/DL (ref 2.3–3.5)
GLOBULIN SER CALC-MCNC: 4.7 G/DL (ref 2.3–3.5)
HCT VFR BLD CALC: 40.7 % (ref 39.6–49)
HCT VFR BLD CALC: 41.3 % (ref 39.6–49)
HGB BLD-MCNC: 14.1 G/DL (ref 13.6–17.9)
HGB BLD-MCNC: 14.3 G/DL (ref 13.6–17.9)
LYMPHOCYTES # SPEC AUTO: 0.6 K/UL (ref 0.7–4.9)
LYMPHOCYTES # SPEC AUTO: 2.2 K/UL (ref 0.7–4.9)
MACROCYTES BLD QL: (no result)
MACROCYTES BLD QL: (no result)
MAGNESIUM SERPL-MCNC: 1.7 MG/DL (ref 1.6–2.4)
MCH RBC QN AUTO: 43.9 PG (ref 27–35)
MCHC RBC AUTO-ENTMCNC: 34.5 G/DL (ref 32–36)
MCHC RBC AUTO-ENTMCNC: 34.8 G/DL (ref 32–36)
MCV RBC: 126.3 FL (ref 80–100)
MCV RBC: 127.3 FL (ref 80–100)
METHAMPHET UR QL SCN: NEGATIVE
MORPHOLOGY BLD-IMP: (no result)
MORPHOLOGY BLD-IMP: (no result)
NRBC BLD AUTO-RTO: 0.1 % (ref 0–0)
NRBC BLD AUTO-RTO: 0.2 % (ref 0–0)
PMV BLD: 6.3 FL (ref 7.6–11.3)
PMV BLD: 6.6 FL (ref 7.6–11.3)
POTASSIUM SERPL-SCNC: 3.1 MEQ/L (ref 3.5–5.1)
RBC # BLD: 3.27 M/UL (ref 4.33–5.43)
SQUAMOUS URNS QL MICRO: <5 /HPF
STOMATOCYTES BLD QL SMEAR: (no result)
THC SERPL-MCNC: NEGATIVE NG/ML
TROPONIN I SERPL HS-MCNC: 9.2 PG/ML (ref ?–58.9)
UA COMPLETE W REFLEX CULTURE PNL UR: (no result)
UA COMPLETE W REFLEX CULTURE PNL UR: (no result)

## 2025-04-04 PROCEDURE — 80076 HEPATIC FUNCTION PANEL: CPT

## 2025-04-04 PROCEDURE — 70450 CT HEAD/BRAIN W/O DYE: CPT

## 2025-04-04 PROCEDURE — 96375 TX/PRO/DX INJ NEW DRUG ADDON: CPT

## 2025-04-04 PROCEDURE — 82550 ASSAY OF CK (CPK): CPT

## 2025-04-04 PROCEDURE — 36415 COLL VENOUS BLD VENIPUNCTURE: CPT

## 2025-04-04 PROCEDURE — 71045 X-RAY EXAM CHEST 1 VIEW: CPT

## 2025-04-04 PROCEDURE — 99284 EMERGENCY DEPT VISIT MOD MDM: CPT

## 2025-04-04 PROCEDURE — 80053 COMPREHEN METABOLIC PANEL: CPT

## 2025-04-04 PROCEDURE — 85025 COMPLETE CBC W/AUTO DIFF WBC: CPT

## 2025-04-04 PROCEDURE — 80048 BASIC METABOLIC PNL TOTAL CA: CPT

## 2025-04-04 PROCEDURE — 96365 THER/PROPH/DIAG IV INF INIT: CPT

## 2025-04-04 PROCEDURE — 74176 CT ABD & PELVIS W/O CONTRAST: CPT

## 2025-04-04 PROCEDURE — 81001 URINALYSIS AUTO W/SCOPE: CPT

## 2025-04-04 PROCEDURE — 70491 CT SOFT TISSUE NECK W/DYE: CPT

## 2025-04-04 PROCEDURE — 93005 ELECTROCARDIOGRAM TRACING: CPT

## 2025-04-04 PROCEDURE — 96360 HYDRATION IV INFUSION INIT: CPT

## 2025-04-04 PROCEDURE — 80307 DRUG TEST PRSMV CHEM ANLYZR: CPT

## 2025-04-04 PROCEDURE — 83735 ASSAY OF MAGNESIUM: CPT

## 2025-04-04 PROCEDURE — 84484 ASSAY OF TROPONIN QUANT: CPT

## 2025-04-04 PROCEDURE — 86308 HETEROPHILE ANTIBODY SCREEN: CPT

## 2025-04-04 PROCEDURE — 96361 HYDRATE IV INFUSION ADD-ON: CPT

## 2025-04-05 VITALS — OXYGEN SATURATION: 100 %

## 2025-04-05 VITALS — TEMPERATURE: 98 F | DIASTOLIC BLOOD PRESSURE: 88 MMHG | SYSTOLIC BLOOD PRESSURE: 148 MMHG
